# Patient Record
Sex: FEMALE | Race: BLACK OR AFRICAN AMERICAN | Employment: UNEMPLOYED | URBAN - METROPOLITAN AREA
[De-identification: names, ages, dates, MRNs, and addresses within clinical notes are randomized per-mention and may not be internally consistent; named-entity substitution may affect disease eponyms.]

---

## 2017-01-01 ENCOUNTER — APPOINTMENT (OUTPATIENT)
Dept: INFUSION THERAPY | Age: 62
End: 2017-01-01

## 2017-01-01 ENCOUNTER — HOSPITAL ENCOUNTER (OUTPATIENT)
Dept: INFUSION THERAPY | Age: 62
End: 2017-01-01
Payer: MEDICAID

## 2017-01-01 ENCOUNTER — HOSPITAL ENCOUNTER (OUTPATIENT)
Dept: INFUSION THERAPY | Age: 62
Discharge: HOME | End: 2017-11-14

## 2017-01-01 ENCOUNTER — PATIENT OUTREACH (OUTPATIENT)
Dept: CASE MANAGEMENT | Age: 62
End: 2017-01-01

## 2017-01-01 ENCOUNTER — HOSPITAL ENCOUNTER (OUTPATIENT)
Dept: INFUSION THERAPY | Age: 62
Discharge: HOME | End: 2017-09-26
Payer: MEDICAID

## 2017-01-01 ENCOUNTER — HOSPITAL ENCOUNTER (OUTPATIENT)
Dept: LAB | Age: 62
Discharge: HOME | End: 2017-08-29
Payer: MEDICAID

## 2017-01-01 ENCOUNTER — HOSPITAL ENCOUNTER (OUTPATIENT)
Dept: RADIATION ONCOLOGY | Age: 62
Discharge: HOME | End: 2017-08-08
Payer: MEDICAID

## 2017-01-01 ENCOUNTER — HOSPITAL ENCOUNTER (OUTPATIENT)
Dept: INFUSION THERAPY | Age: 62
Discharge: HOME | End: 2017-08-15
Payer: MEDICAID

## 2017-01-01 ENCOUNTER — HOSPITAL ENCOUNTER (OUTPATIENT)
Dept: RADIATION ONCOLOGY | Age: 62
Discharge: HOME | End: 2017-08-28
Payer: MEDICAID

## 2017-01-01 ENCOUNTER — HOSPITAL ENCOUNTER (OUTPATIENT)
Dept: INFUSION THERAPY | Age: 62
Discharge: HOME | End: 2017-10-26
Payer: COMMERCIAL

## 2017-01-01 ENCOUNTER — APPOINTMENT (OUTPATIENT)
Dept: CT IMAGING | Age: 62
End: 2017-01-01
Payer: MEDICAID

## 2017-01-01 ENCOUNTER — HOSPITAL ENCOUNTER (OUTPATIENT)
Dept: LAB | Age: 62
Discharge: HOME | End: 2017-08-15
Payer: MEDICAID

## 2017-01-01 ENCOUNTER — HOSPITAL ENCOUNTER (OUTPATIENT)
Dept: RADIATION ONCOLOGY | Age: 62
Discharge: HOME | End: 2017-08-22
Payer: MEDICAID

## 2017-01-01 ENCOUNTER — HOSPITAL ENCOUNTER (OUTPATIENT)
Dept: MRI IMAGING | Age: 62
Discharge: HOME | End: 2017-07-10
Attending: INTERNAL MEDICINE
Payer: MEDICAID

## 2017-01-01 ENCOUNTER — HOSPITAL ENCOUNTER (OUTPATIENT)
Dept: RADIATION ONCOLOGY | Age: 62
Discharge: HOME | End: 2017-08-17
Payer: MEDICAID

## 2017-01-01 ENCOUNTER — APPOINTMENT (OUTPATIENT)
Dept: RADIATION ONCOLOGY | Age: 62
End: 2017-01-01
Payer: MEDICAID

## 2017-01-01 ENCOUNTER — TELEPHONE (OUTPATIENT)
Dept: ONCOLOGY | Age: 62
End: 2017-01-01

## 2017-01-01 ENCOUNTER — HOSPITAL ENCOUNTER (OUTPATIENT)
Dept: LAB | Age: 62
Discharge: HOME | End: 2017-11-14
Payer: COMMERCIAL

## 2017-01-01 ENCOUNTER — HOSPITAL ENCOUNTER (OUTPATIENT)
Dept: INFUSION THERAPY | Age: 62
Discharge: HOME | End: 2017-12-06
Payer: COMMERCIAL

## 2017-01-01 ENCOUNTER — DOCUMENTATION ONLY (OUTPATIENT)
Dept: ONCOLOGY | Age: 62
End: 2017-01-01

## 2017-01-01 ENCOUNTER — HOSPITAL ENCOUNTER (OUTPATIENT)
Dept: INFUSION THERAPY | Age: 62
Discharge: HOME | End: 2017-12-05
Payer: COMMERCIAL

## 2017-01-01 ENCOUNTER — HOSPITAL ENCOUNTER (OUTPATIENT)
Dept: INTERVENTIONAL RADIOLOGY/VASCULAR | Age: 62
Discharge: HOME | End: 2017-08-02
Attending: INTERNAL MEDICINE
Payer: MEDICAID

## 2017-01-01 ENCOUNTER — HOSPITAL ENCOUNTER (OUTPATIENT)
Dept: INFUSION THERAPY | Age: 62
Discharge: HOME | End: 2017-09-01
Payer: MEDICAID

## 2017-01-01 ENCOUNTER — HOSPITAL ENCOUNTER (OUTPATIENT)
Dept: INFUSION THERAPY | Age: 62
Discharge: HOME | End: 2017-09-27
Payer: MEDICAID

## 2017-01-01 ENCOUNTER — HOSPITAL ENCOUNTER (OUTPATIENT)
Dept: LAB | Age: 62
Discharge: HOME | End: 2017-12-04
Payer: COMMERCIAL

## 2017-01-01 ENCOUNTER — HOSPITAL ENCOUNTER (OUTPATIENT)
Dept: LAB | Age: 62
Discharge: HOME | End: 2017-09-26
Payer: MEDICAID

## 2017-01-01 ENCOUNTER — HOSPITAL ENCOUNTER (OUTPATIENT)
Dept: PET IMAGING | Age: 62
Discharge: HOME | End: 2017-07-06
Attending: INTERNAL MEDICINE
Payer: MEDICAID

## 2017-01-01 ENCOUNTER — HOSPITAL ENCOUNTER (OUTPATIENT)
Dept: RADIATION ONCOLOGY | Age: 62
Discharge: HOME | End: 2017-08-11
Payer: MEDICAID

## 2017-01-01 ENCOUNTER — HOSPITAL ENCOUNTER (OUTPATIENT)
Dept: RADIATION ONCOLOGY | Age: 62
Discharge: HOME | End: 2017-08-16
Payer: MEDICAID

## 2017-01-01 ENCOUNTER — HOSPITAL ENCOUNTER (OUTPATIENT)
Dept: INFUSION THERAPY | Age: 62
Discharge: HOME | End: 2017-08-30
Payer: MEDICAID

## 2017-01-01 ENCOUNTER — HOSPITAL ENCOUNTER (OUTPATIENT)
Dept: INFUSION THERAPY | Age: 62
Discharge: HOME | End: 2017-10-27
Payer: COMMERCIAL

## 2017-01-01 ENCOUNTER — HOSPITAL ENCOUNTER (OUTPATIENT)
Dept: INFUSION THERAPY | Age: 62
Discharge: HOME | End: 2017-12-07
Payer: COMMERCIAL

## 2017-01-01 ENCOUNTER — DOCUMENTATION ONLY (OUTPATIENT)
Dept: HEMATOLOGY | Age: 62
End: 2017-01-01

## 2017-01-01 ENCOUNTER — HOSPITAL ENCOUNTER (OUTPATIENT)
Dept: INFUSION THERAPY | Age: 62
End: 2017-01-01
Payer: COMMERCIAL

## 2017-01-01 ENCOUNTER — HOSPITAL ENCOUNTER (OUTPATIENT)
Dept: RADIATION ONCOLOGY | Age: 62
Discharge: HOME | End: 2017-08-21
Payer: MEDICAID

## 2017-01-01 ENCOUNTER — APPOINTMENT (OUTPATIENT)
Dept: GENERAL RADIOLOGY | Age: 62
End: 2017-01-01
Payer: MEDICAID

## 2017-01-01 ENCOUNTER — HOSPITAL ENCOUNTER (OUTPATIENT)
Dept: INFUSION THERAPY | Age: 62
Discharge: HOME | End: 2017-10-24
Payer: COMMERCIAL

## 2017-01-01 ENCOUNTER — HOSPITAL ENCOUNTER (OUTPATIENT)
Dept: RADIATION ONCOLOGY | Age: 62
Discharge: HOME | End: 2017-08-03
Payer: MEDICAID

## 2017-01-01 ENCOUNTER — HOSPITAL ENCOUNTER (OUTPATIENT)
Dept: RADIATION ONCOLOGY | Age: 62
Discharge: HOME | End: 2017-09-27
Payer: MEDICAID

## 2017-01-01 ENCOUNTER — HOSPITAL ENCOUNTER (OUTPATIENT)
Dept: RADIATION ONCOLOGY | Age: 62
Discharge: HOME | End: 2017-08-15
Payer: MEDICAID

## 2017-01-01 ENCOUNTER — HOSPITAL ENCOUNTER (OUTPATIENT)
Dept: LAB | Age: 62
Discharge: HOME | End: 2017-07-25
Payer: MEDICAID

## 2017-01-01 ENCOUNTER — HOSPITAL ENCOUNTER (OUTPATIENT)
Dept: LAB | Age: 62
Discharge: HOME | End: 2017-10-24
Payer: COMMERCIAL

## 2017-01-01 ENCOUNTER — HOSPITAL ENCOUNTER (OUTPATIENT)
Dept: INFUSION THERAPY | Age: 62
Discharge: HOME | End: 2017-09-28
Payer: MEDICAID

## 2017-01-01 ENCOUNTER — HOSPITAL ENCOUNTER (OUTPATIENT)
Dept: CT IMAGING | Age: 62
Discharge: HOME | End: 2017-11-29
Attending: INTERNAL MEDICINE
Payer: COMMERCIAL

## 2017-01-01 ENCOUNTER — HOSPITAL ENCOUNTER (OUTPATIENT)
Dept: INFUSION THERAPY | Age: 62
End: 2017-01-01

## 2017-01-01 ENCOUNTER — HOSPITAL ENCOUNTER (OUTPATIENT)
Dept: INFUSION THERAPY | Age: 62
Discharge: HOME | End: 2017-08-31
Payer: MEDICAID

## 2017-01-01 ENCOUNTER — HOSPITAL ENCOUNTER (EMERGENCY)
Age: 62
Discharge: HOME | End: 2017-06-19
Attending: EMERGENCY MEDICINE
Payer: MEDICAID

## 2017-01-01 ENCOUNTER — HOSPITAL ENCOUNTER (OUTPATIENT)
Dept: RADIATION ONCOLOGY | Age: 62
Discharge: HOME | End: 2017-08-14
Payer: MEDICAID

## 2017-01-01 ENCOUNTER — HOSPITAL ENCOUNTER (OUTPATIENT)
Dept: RADIATION ONCOLOGY | Age: 62
Discharge: HOME | End: 2017-08-10
Payer: MEDICAID

## 2017-01-01 ENCOUNTER — HOSPITAL ENCOUNTER (OUTPATIENT)
Dept: INFUSION THERAPY | Age: 62
Discharge: HOME | End: 2017-09-05
Payer: MEDICAID

## 2017-01-01 ENCOUNTER — HOSPITAL ENCOUNTER (OUTPATIENT)
Age: 62
Setting detail: OUTPATIENT SURGERY
Discharge: HOME | End: 2017-07-19
Attending: INTERNAL MEDICINE | Admitting: INTERNAL MEDICINE
Payer: MEDICAID

## 2017-01-01 ENCOUNTER — HOSPITAL ENCOUNTER (OUTPATIENT)
Dept: RADIATION ONCOLOGY | Age: 62
Discharge: HOME | End: 2017-08-29
Payer: MEDICAID

## 2017-01-01 ENCOUNTER — HOSPITAL ENCOUNTER (OUTPATIENT)
Dept: RADIATION ONCOLOGY | Age: 62
Discharge: HOME | End: 2017-08-09
Payer: MEDICAID

## 2017-01-01 ENCOUNTER — HOSPITAL ENCOUNTER (OUTPATIENT)
Dept: RADIATION ONCOLOGY | Age: 62
Discharge: HOME | End: 2017-08-07
Payer: MEDICAID

## 2017-01-01 ENCOUNTER — HOSPITAL ENCOUNTER (OUTPATIENT)
Dept: RADIATION ONCOLOGY | Age: 62
Discharge: HOME | End: 2017-08-18
Payer: MEDICAID

## 2017-01-01 ENCOUNTER — HOSPITAL ENCOUNTER (OUTPATIENT)
Dept: MRI IMAGING | Age: 62
Discharge: HOME | End: 2017-11-29
Attending: INTERNAL MEDICINE
Payer: COMMERCIAL

## 2017-01-01 ENCOUNTER — HOSPITAL ENCOUNTER (OUTPATIENT)
Dept: INFUSION THERAPY | Age: 62
Discharge: HOME | End: 2017-08-29
Payer: MEDICAID

## 2017-01-01 ENCOUNTER — HOSPITAL ENCOUNTER (OUTPATIENT)
Dept: RADIATION ONCOLOGY | Age: 62
Discharge: HOME | End: 2017-08-04
Payer: MEDICAID

## 2017-01-01 ENCOUNTER — HOSPITAL ENCOUNTER (OUTPATIENT)
Dept: PET IMAGING | Age: 62
Discharge: HOME | End: 2017-07-13
Attending: INTERNAL MEDICINE
Payer: MEDICAID

## 2017-01-01 ENCOUNTER — HOSPITAL ENCOUNTER (OUTPATIENT)
Dept: RADIATION ONCOLOGY | Age: 62
Discharge: HOME | End: 2017-08-23
Payer: MEDICAID

## 2017-01-01 VITALS
HEART RATE: 67 BPM | DIASTOLIC BLOOD PRESSURE: 53 MMHG | WEIGHT: 155 LBS | SYSTOLIC BLOOD PRESSURE: 107 MMHG | BODY MASS INDEX: 26.61 KG/M2 | OXYGEN SATURATION: 97 % | TEMPERATURE: 97.9 F | RESPIRATION RATE: 18 BRPM

## 2017-01-01 VITALS
TEMPERATURE: 97.8 F | WEIGHT: 147.2 LBS | RESPIRATION RATE: 18 BRPM | DIASTOLIC BLOOD PRESSURE: 91 MMHG | SYSTOLIC BLOOD PRESSURE: 118 MMHG | OXYGEN SATURATION: 100 % | BODY MASS INDEX: 25.27 KG/M2 | HEART RATE: 81 BPM

## 2017-01-01 VITALS
SYSTOLIC BLOOD PRESSURE: 132 MMHG | BODY MASS INDEX: 27.29 KG/M2 | WEIGHT: 159 LBS | DIASTOLIC BLOOD PRESSURE: 83 MMHG | HEART RATE: 80 BPM | TEMPERATURE: 97.7 F | OXYGEN SATURATION: 98 %

## 2017-01-01 VITALS
SYSTOLIC BLOOD PRESSURE: 93 MMHG | WEIGHT: 133 LBS | RESPIRATION RATE: 16 BRPM | BODY MASS INDEX: 22.83 KG/M2 | OXYGEN SATURATION: 100 % | HEART RATE: 83 BPM | TEMPERATURE: 98.7 F | DIASTOLIC BLOOD PRESSURE: 64 MMHG

## 2017-01-01 VITALS
BODY MASS INDEX: 24.34 KG/M2 | SYSTOLIC BLOOD PRESSURE: 122 MMHG | HEART RATE: 81 BPM | RESPIRATION RATE: 16 BRPM | WEIGHT: 141.8 LBS | DIASTOLIC BLOOD PRESSURE: 58 MMHG | TEMPERATURE: 97.9 F | OXYGEN SATURATION: 100 %

## 2017-01-01 VITALS
DIASTOLIC BLOOD PRESSURE: 70 MMHG | HEART RATE: 104 BPM | WEIGHT: 145 LBS | TEMPERATURE: 98 F | RESPIRATION RATE: 18 BRPM | SYSTOLIC BLOOD PRESSURE: 114 MMHG | OXYGEN SATURATION: 96 % | BODY MASS INDEX: 24.89 KG/M2

## 2017-01-01 VITALS
DIASTOLIC BLOOD PRESSURE: 72 MMHG | WEIGHT: 157.2 LBS | OXYGEN SATURATION: 97 % | BODY MASS INDEX: 26.98 KG/M2 | SYSTOLIC BLOOD PRESSURE: 132 MMHG | HEART RATE: 69 BPM | TEMPERATURE: 97.9 F | RESPIRATION RATE: 18 BRPM

## 2017-01-01 VITALS
SYSTOLIC BLOOD PRESSURE: 137 MMHG | TEMPERATURE: 97.8 F | DIASTOLIC BLOOD PRESSURE: 82 MMHG | BODY MASS INDEX: 27.66 KG/M2 | WEIGHT: 162 LBS | HEIGHT: 64 IN | HEART RATE: 65 BPM | RESPIRATION RATE: 16 BRPM | OXYGEN SATURATION: 98 %

## 2017-01-01 VITALS
RESPIRATION RATE: 16 BRPM | SYSTOLIC BLOOD PRESSURE: 105 MMHG | OXYGEN SATURATION: 100 % | DIASTOLIC BLOOD PRESSURE: 70 MMHG | HEART RATE: 73 BPM | BODY MASS INDEX: 23.69 KG/M2 | TEMPERATURE: 97.4 F | WEIGHT: 138 LBS

## 2017-01-01 VITALS
BODY MASS INDEX: 24.24 KG/M2 | RESPIRATION RATE: 16 BRPM | OXYGEN SATURATION: 99 % | WEIGHT: 141.2 LBS | DIASTOLIC BLOOD PRESSURE: 68 MMHG | HEART RATE: 82 BPM | TEMPERATURE: 97.8 F | SYSTOLIC BLOOD PRESSURE: 112 MMHG

## 2017-01-01 VITALS
TEMPERATURE: 97.7 F | HEART RATE: 69 BPM | OXYGEN SATURATION: 99 % | BODY MASS INDEX: 26.19 KG/M2 | DIASTOLIC BLOOD PRESSURE: 61 MMHG | SYSTOLIC BLOOD PRESSURE: 95 MMHG | WEIGHT: 152.6 LBS | RESPIRATION RATE: 18 BRPM

## 2017-01-01 VITALS
BODY MASS INDEX: 23.34 KG/M2 | HEART RATE: 82 BPM | OXYGEN SATURATION: 98 % | WEIGHT: 136 LBS | TEMPERATURE: 97.5 F | SYSTOLIC BLOOD PRESSURE: 104 MMHG | RESPIRATION RATE: 16 BRPM | DIASTOLIC BLOOD PRESSURE: 55 MMHG

## 2017-01-01 VITALS
HEIGHT: 67 IN | WEIGHT: 210 LBS | RESPIRATION RATE: 18 BRPM | BODY MASS INDEX: 32.96 KG/M2 | OXYGEN SATURATION: 98 % | DIASTOLIC BLOOD PRESSURE: 94 MMHG | TEMPERATURE: 98.6 F | HEART RATE: 87 BPM | SYSTOLIC BLOOD PRESSURE: 167 MMHG

## 2017-01-01 VITALS
WEIGHT: 144.2 LBS | DIASTOLIC BLOOD PRESSURE: 75 MMHG | RESPIRATION RATE: 18 BRPM | HEART RATE: 69 BPM | SYSTOLIC BLOOD PRESSURE: 112 MMHG | OXYGEN SATURATION: 100 % | TEMPERATURE: 97.6 F | BODY MASS INDEX: 24.75 KG/M2

## 2017-01-01 VITALS
OXYGEN SATURATION: 95 % | SYSTOLIC BLOOD PRESSURE: 108 MMHG | WEIGHT: 160 LBS | DIASTOLIC BLOOD PRESSURE: 68 MMHG | BODY MASS INDEX: 25.11 KG/M2 | RESPIRATION RATE: 18 BRPM | HEART RATE: 98 BPM | HEIGHT: 67 IN | TEMPERATURE: 98.1 F

## 2017-01-01 DIAGNOSIS — R91.1 LUNG NODULE: ICD-10-CM

## 2017-01-01 DIAGNOSIS — C34.81 MALIGNANT NEOPLASM OF OVERLAPPING SITES OF RIGHT LUNG (HCC): ICD-10-CM

## 2017-01-01 DIAGNOSIS — Z72.0 TOBACCO ABUSE: ICD-10-CM

## 2017-01-01 DIAGNOSIS — C34.92 SMALL CELL CARCINOMA OF LUNG, LEFT (HCC): ICD-10-CM

## 2017-01-01 DIAGNOSIS — C79.31 BRAIN METASTASES (HCC): Primary | ICD-10-CM

## 2017-01-01 DIAGNOSIS — M54.30 SCIATICA, UNSPECIFIED LATERALITY: ICD-10-CM

## 2017-01-01 DIAGNOSIS — R11.2 NAUSEA AND VOMITING IN ADULT: ICD-10-CM

## 2017-01-01 DIAGNOSIS — R63.8 DEHYDRATION SYMPTOMS: ICD-10-CM

## 2017-01-01 DIAGNOSIS — R91.8 MULTIPLE LUNG NODULES ON CT: ICD-10-CM

## 2017-01-01 DIAGNOSIS — R59.0 MEDIASTINAL ADENOPATHY: ICD-10-CM

## 2017-01-01 DIAGNOSIS — R91.8 HILAR MASS: ICD-10-CM

## 2017-01-01 DIAGNOSIS — A59.9 TRICHOMONIASIS: ICD-10-CM

## 2017-01-01 DIAGNOSIS — I10 ESSENTIAL HYPERTENSION: Primary | ICD-10-CM

## 2017-01-01 DIAGNOSIS — F17.210 CIGARETTE SMOKER: ICD-10-CM

## 2017-01-01 DIAGNOSIS — R91.8 MASS OF RIGHT LUNG: ICD-10-CM

## 2017-01-01 LAB
ALBUMIN SERPL BCP-MCNC: 3.2 G/DL (ref 3.5–5)
ALBUMIN SERPL BCP-MCNC: 3.5 G/DL (ref 3.5–5)
ALBUMIN SERPL BCP-MCNC: 3.6 G/DL (ref 3.5–5)
ALBUMIN SERPL-MCNC: 3.2 G/DL (ref 3.2–4.6)
ALBUMIN SERPL-MCNC: 3.3 G/DL (ref 3.2–4.6)
ALBUMIN SERPL-MCNC: 3.3 G/DL (ref 3.2–4.6)
ALBUMIN SERPL-MCNC: 3.5 G/DL (ref 3.5–5)
ALBUMIN SERPL-MCNC: 3.6 G/DL (ref 3.2–4.6)
ALBUMIN SERPL-MCNC: 3.8 G/DL (ref 3.5–5)
ALBUMIN/GLOB SERPL: 0.8 {RATIO} (ref 1.2–3.5)
ALBUMIN/GLOB SERPL: 0.9 {RATIO} (ref 1.2–3.5)
ALBUMIN/GLOB SERPL: 1 {RATIO} (ref 1.2–3.5)
ALBUMIN/GLOB SERPL: 1 {RATIO} (ref 1.2–3.5)
ALBUMIN/GLOB SERPL: 1.1 {RATIO} (ref 1.2–3.5)
ALP SERPL-CCNC: 41 U/L (ref 50–136)
ALP SERPL-CCNC: 45 U/L (ref 50–136)
ALP SERPL-CCNC: 47 U/L (ref 50–136)
ALP SERPL-CCNC: 48 U/L (ref 50–136)
ALP SERPL-CCNC: 51 U/L (ref 50–136)
ALP SERPL-CCNC: 51 U/L (ref 50–136)
ALP SERPL-CCNC: 58 U/L (ref 50–136)
ALP SERPL-CCNC: 58 U/L (ref 50–136)
ALP SERPL-CCNC: 60 U/L (ref 50–136)
ALT SERPL-CCNC: 12 U/L (ref 12–65)
ALT SERPL-CCNC: 14 U/L (ref 12–65)
ALT SERPL-CCNC: 14 U/L (ref 12–65)
ALT SERPL-CCNC: 15 U/L (ref 12–65)
ALT SERPL-CCNC: 15 U/L (ref 12–65)
ALT SERPL-CCNC: 16 U/L (ref 12–65)
ALT SERPL-CCNC: 17 U/L (ref 12–65)
ALT SERPL-CCNC: 17 U/L (ref 12–65)
ALT SERPL-CCNC: 25 U/L (ref 12–65)
ANION GAP BLD CALC-SCNC: 10 MMOL/L (ref 7–16)
ANION GAP BLD CALC-SCNC: 5 MMOL/L (ref 7–16)
ANION GAP BLD CALC-SCNC: 6 MMOL/L (ref 7–16)
ANION GAP SERPL CALC-SCNC: 11 MMOL/L (ref 7–16)
ANION GAP SERPL CALC-SCNC: 5 MMOL/L (ref 7–16)
ANION GAP SERPL CALC-SCNC: 7 MMOL/L (ref 7–16)
ANION GAP SERPL CALC-SCNC: 9 MMOL/L (ref 7–16)
AST SERPL W P-5'-P-CCNC: 14 U/L (ref 15–37)
AST SERPL W P-5'-P-CCNC: 20 U/L (ref 15–37)
AST SERPL W P-5'-P-CCNC: 30 U/L (ref 15–37)
AST SERPL-CCNC: 14 U/L (ref 15–37)
AST SERPL-CCNC: 14 U/L (ref 15–37)
AST SERPL-CCNC: 17 U/L (ref 15–37)
AST SERPL-CCNC: 17 U/L (ref 15–37)
AST SERPL-CCNC: 18 U/L (ref 15–37)
AST SERPL-CCNC: 19 U/L (ref 15–37)
ATRIAL RATE: 84 BPM
BACTERIA URNS QL MICRO: 0 /HPF
BASOPHILS # BLD AUTO: 0 K/UL (ref 0–0.2)
BASOPHILS # BLD: 0 % (ref 0–2)
BASOPHILS # BLD: 0 K/UL (ref 0–0.2)
BASOPHILS # BLD: 1 % (ref 0–2)
BASOPHILS # BLD: 1 % (ref 0–2)
BASOPHILS NFR BLD: 0 % (ref 0–2)
BASOPHILS NFR BLD: 0 % (ref 0–2)
BASOPHILS NFR BLD: 1 % (ref 0–2)
BILIRUB SERPL-MCNC: 0.3 MG/DL (ref 0.2–1.1)
BILIRUB SERPL-MCNC: 0.4 MG/DL (ref 0.2–1.1)
BILIRUB SERPL-MCNC: 0.5 MG/DL (ref 0.2–1.1)
BILIRUB SERPL-MCNC: 0.5 MG/DL (ref 0.2–1.1)
BILIRUB SERPL-MCNC: 0.6 MG/DL (ref 0.2–1.1)
BILIRUB SERPL-MCNC: 0.6 MG/DL (ref 0.2–1.1)
BILIRUB SERPL-MCNC: 1 MG/DL (ref 0.2–1.1)
BUN SERPL-MCNC: 13 MG/DL (ref 6–23)
BUN SERPL-MCNC: 14 MG/DL (ref 6–23)
BUN SERPL-MCNC: 29 MG/DL (ref 6–23)
BUN SERPL-MCNC: 7 MG/DL (ref 8–23)
BUN SERPL-MCNC: 9 MG/DL (ref 8–23)
C TRACH RRNA SPEC QL NAA+PROBE: NEGATIVE
CALCIUM SERPL-MCNC: 8.2 MG/DL (ref 8.3–10.4)
CALCIUM SERPL-MCNC: 8.5 MG/DL (ref 8.3–10.4)
CALCIUM SERPL-MCNC: 8.7 MG/DL (ref 8.3–10.4)
CALCIUM SERPL-MCNC: 8.8 MG/DL (ref 8.3–10.4)
CALCIUM SERPL-MCNC: 9 MG/DL (ref 8.3–10.4)
CALCIUM SERPL-MCNC: 9.2 MG/DL (ref 8.3–10.4)
CALCIUM SERPL-MCNC: 9.2 MG/DL (ref 8.3–10.4)
CALCULATED P AXIS, ECG09: 66 DEGREES
CALCULATED R AXIS, ECG10: 77 DEGREES
CALCULATED T AXIS, ECG11: 54 DEGREES
CASTS URNS QL MICRO: 0 /LPF
CHLORIDE SERPL-SCNC: 104 MMOL/L (ref 98–107)
CHLORIDE SERPL-SCNC: 106 MMOL/L (ref 98–107)
CHLORIDE SERPL-SCNC: 107 MMOL/L (ref 98–107)
CHLORIDE SERPL-SCNC: 108 MMOL/L (ref 98–107)
CHLORIDE SERPL-SCNC: 110 MMOL/L (ref 98–107)
CHLORIDE SERPL-SCNC: 98 MMOL/L (ref 98–107)
CO2 SERPL-SCNC: 26 MMOL/L (ref 21–32)
CO2 SERPL-SCNC: 27 MMOL/L (ref 21–32)
CO2 SERPL-SCNC: 27 MMOL/L (ref 21–32)
CO2 SERPL-SCNC: 28 MMOL/L (ref 21–32)
CO2 SERPL-SCNC: 29 MMOL/L (ref 21–32)
CO2 SERPL-SCNC: 29 MMOL/L (ref 21–32)
CREAT SERPL-MCNC: 0.7 MG/DL (ref 0.6–1)
CREAT SERPL-MCNC: 0.74 MG/DL (ref 0.6–1)
CREAT SERPL-MCNC: 0.82 MG/DL (ref 0.6–1)
CREAT SERPL-MCNC: 0.84 MG/DL (ref 0.6–1)
CREAT SERPL-MCNC: 0.92 MG/DL (ref 0.6–1)
CREAT SERPL-MCNC: 0.95 MG/DL (ref 0.6–1)
CREAT SERPL-MCNC: 0.99 MG/DL (ref 0.6–1)
CREAT SERPL-MCNC: 1.2 MG/DL (ref 0.6–1)
CREAT SERPL-MCNC: 1.2 MG/DL (ref 0.6–1)
CRYSTALS URNS QL MICRO: 0 /LPF
DIAGNOSIS, 93000: NORMAL
DIFFERENTIAL METHOD BLD: ABNORMAL
DIFFERENTIAL METHOD BLD: NORMAL
EOSINOPHIL # BLD: 0 K/UL (ref 0–0.8)
EOSINOPHIL # BLD: 0.1 K/UL (ref 0–0.8)
EOSINOPHIL # BLD: 0.1 K/UL (ref 0–0.8)
EOSINOPHIL # BLD: 0.2 K/UL (ref 0–0.8)
EOSINOPHIL # BLD: 0.2 K/UL (ref 0–0.8)
EOSINOPHIL NFR BLD: 0 % (ref 0.5–7.8)
EOSINOPHIL NFR BLD: 1 % (ref 0.5–7.8)
EOSINOPHIL NFR BLD: 1 % (ref 0.5–7.8)
EOSINOPHIL NFR BLD: 2 % (ref 0.5–7.8)
EOSINOPHIL NFR BLD: 3 % (ref 0.5–7.8)
EOSINOPHIL NFR BLD: 3 % (ref 0.5–7.8)
EOSINOPHIL NFR BLD: 4 % (ref 0.5–7.8)
EPI CELLS #/AREA URNS HPF: NORMAL /HPF
ERYTHROCYTE [DISTWIDTH] IN BLOOD BY AUTOMATED COUNT: 13.7 % (ref 11.9–14.6)
ERYTHROCYTE [DISTWIDTH] IN BLOOD BY AUTOMATED COUNT: 13.8 % (ref 11.9–14.6)
ERYTHROCYTE [DISTWIDTH] IN BLOOD BY AUTOMATED COUNT: 13.9 % (ref 11.9–14.6)
ERYTHROCYTE [DISTWIDTH] IN BLOOD BY AUTOMATED COUNT: 14 % (ref 11.9–14.6)
ERYTHROCYTE [DISTWIDTH] IN BLOOD BY AUTOMATED COUNT: 14 % (ref 11.9–14.6)
ERYTHROCYTE [DISTWIDTH] IN BLOOD BY AUTOMATED COUNT: 14.4 % (ref 11.9–14.6)
ERYTHROCYTE [DISTWIDTH] IN BLOOD BY AUTOMATED COUNT: 16.3 % (ref 11.9–14.6)
ERYTHROCYTE [DISTWIDTH] IN BLOOD BY AUTOMATED COUNT: 16.7 % (ref 11.9–14.6)
ERYTHROCYTE [DISTWIDTH] IN BLOOD BY AUTOMATED COUNT: 19.7 % (ref 11.9–14.6)
GLOBULIN SER CALC-MCNC: 3.1 G/DL (ref 2.3–3.5)
GLOBULIN SER CALC-MCNC: 3.5 G/DL (ref 2.3–3.5)
GLOBULIN SER CALC-MCNC: 3.6 G/DL (ref 2.3–3.5)
GLOBULIN SER CALC-MCNC: 3.7 G/DL (ref 2.3–3.5)
GLOBULIN SER CALC-MCNC: 3.7 G/DL (ref 2.3–3.5)
GLOBULIN SER CALC-MCNC: 3.8 G/DL (ref 2.3–3.5)
GLOBULIN SER CALC-MCNC: 4 G/DL (ref 2.3–3.5)
GLOBULIN SER CALC-MCNC: 4.5 G/DL (ref 2.3–3.5)
GLOBULIN SER CALC-MCNC: 4.6 G/DL (ref 2.3–3.5)
GLUCOSE SERPL-MCNC: 109 MG/DL (ref 65–100)
GLUCOSE SERPL-MCNC: 61 MG/DL (ref 65–100)
GLUCOSE SERPL-MCNC: 90 MG/DL (ref 65–100)
GLUCOSE SERPL-MCNC: 92 MG/DL (ref 65–100)
GLUCOSE SERPL-MCNC: 94 MG/DL (ref 65–100)
GLUCOSE SERPL-MCNC: 95 MG/DL (ref 65–100)
GLUCOSE SERPL-MCNC: 98 MG/DL (ref 65–100)
HCT VFR BLD AUTO: 21.6 % (ref 35.8–46.3)
HCT VFR BLD AUTO: 25.9 % (ref 35.8–46.3)
HCT VFR BLD AUTO: 29 % (ref 35.8–46.3)
HCT VFR BLD AUTO: 30.9 % (ref 35.8–46.3)
HCT VFR BLD AUTO: 35.3 % (ref 35.8–46.3)
HCT VFR BLD AUTO: 35.8 % (ref 35.8–46.3)
HCT VFR BLD AUTO: 36.2 % (ref 35.8–46.3)
HCT VFR BLD AUTO: 37.7 % (ref 35.8–46.3)
HCT VFR BLD AUTO: 41.8 % (ref 35.8–46.3)
HGB BLD-MCNC: 10.3 G/DL (ref 11.7–15.4)
HGB BLD-MCNC: 11.6 G/DL (ref 11.7–15.4)
HGB BLD-MCNC: 11.9 G/DL (ref 11.7–15.4)
HGB BLD-MCNC: 12.1 G/DL (ref 11.7–15.4)
HGB BLD-MCNC: 12.7 G/DL (ref 11.7–15.4)
HGB BLD-MCNC: 13.9 G/DL (ref 11.7–15.4)
HGB BLD-MCNC: 7.2 G/DL (ref 11.7–15.4)
HGB BLD-MCNC: 8.4 G/DL (ref 11.7–15.4)
HGB BLD-MCNC: 9.5 G/DL (ref 11.7–15.4)
IMM GRANULOCYTES # BLD: 0 K/UL (ref 0–0.5)
IMM GRANULOCYTES NFR BLD AUTO: 0.3 % (ref 0–5)
INR BLD: NORMAL
LYMPHOCYTES # BLD AUTO: 38 % (ref 13–44)
LYMPHOCYTES # BLD AUTO: 41 % (ref 13–44)
LYMPHOCYTES # BLD AUTO: 48 % (ref 13–44)
LYMPHOCYTES # BLD: 0.9 K/UL (ref 0.5–4.6)
LYMPHOCYTES # BLD: 0.9 K/UL (ref 0.5–4.6)
LYMPHOCYTES # BLD: 1 K/UL (ref 0.5–4.6)
LYMPHOCYTES # BLD: 1 K/UL (ref 0.5–4.6)
LYMPHOCYTES # BLD: 1.2 K/UL (ref 0.5–4.6)
LYMPHOCYTES # BLD: 1.4 K/UL (ref 0.5–4.6)
LYMPHOCYTES # BLD: 1.6 K/UL (ref 0.5–4.6)
LYMPHOCYTES # BLD: 2.7 K/UL (ref 0.5–4.6)
LYMPHOCYTES # BLD: 2.8 K/UL (ref 0.5–4.6)
LYMPHOCYTES NFR BLD: 20 % (ref 13–44)
LYMPHOCYTES NFR BLD: 23 % (ref 13–44)
LYMPHOCYTES NFR BLD: 25 % (ref 13–44)
LYMPHOCYTES NFR BLD: 33 % (ref 13–44)
LYMPHOCYTES NFR BLD: 36 % (ref 13–44)
LYMPHOCYTES NFR BLD: 57 % (ref 13–44)
MAGNESIUM SERPL-MCNC: 1.3 MG/DL (ref 1.8–2.4)
MAGNESIUM SERPL-MCNC: 1.7 MG/DL (ref 1.8–2.4)
MAGNESIUM SERPL-MCNC: 1.9 MG/DL (ref 1.8–2.4)
MAGNESIUM SERPL-MCNC: 2.1 MG/DL (ref 1.8–2.4)
MAGNESIUM SERPL-MCNC: 2.6 MG/DL (ref 1.8–2.4)
MCH RBC QN AUTO: 28.6 PG (ref 26.1–32.9)
MCH RBC QN AUTO: 28.7 PG (ref 26.1–32.9)
MCH RBC QN AUTO: 28.9 PG (ref 26.1–32.9)
MCH RBC QN AUTO: 28.9 PG (ref 26.1–32.9)
MCH RBC QN AUTO: 29 PG (ref 26.1–32.9)
MCH RBC QN AUTO: 29.1 PG (ref 26.1–32.9)
MCH RBC QN AUTO: 29.1 PG (ref 26.1–32.9)
MCH RBC QN AUTO: 29.5 PG (ref 26.1–32.9)
MCH RBC QN AUTO: 30.4 PG (ref 26.1–32.9)
MCHC RBC AUTO-ENTMCNC: 32.4 G/DL (ref 31.4–35)
MCHC RBC AUTO-ENTMCNC: 32.8 G/DL (ref 31.4–35)
MCHC RBC AUTO-ENTMCNC: 32.9 G/DL (ref 31.4–35)
MCHC RBC AUTO-ENTMCNC: 33.2 G/DL (ref 31.4–35)
MCHC RBC AUTO-ENTMCNC: 33.3 G/DL (ref 31.4–35)
MCHC RBC AUTO-ENTMCNC: 33.4 G/DL (ref 31.4–35)
MCHC RBC AUTO-ENTMCNC: 33.7 G/DL (ref 31.4–35)
MCV RBC AUTO: 84.9 FL (ref 79.6–97.8)
MCV RBC AUTO: 86.4 FL (ref 79.6–97.8)
MCV RBC AUTO: 86.9 FL (ref 79.6–97.8)
MCV RBC AUTO: 87.3 FL (ref 79.6–97.8)
MCV RBC AUTO: 87.4 FL (ref 79.6–97.8)
MCV RBC AUTO: 87.4 FL (ref 79.6–97.8)
MCV RBC AUTO: 88.4 FL (ref 79.6–97.8)
MCV RBC AUTO: 88.5 FL (ref 79.6–97.8)
MCV RBC AUTO: 93.8 FL (ref 79.6–97.8)
MONOCYTES # BLD: 0 K/UL (ref 0.1–1.3)
MONOCYTES # BLD: 0.4 K/UL (ref 0.1–1.3)
MONOCYTES # BLD: 0.6 K/UL (ref 0.1–1.3)
MONOCYTES # BLD: 0.6 K/UL (ref 0.1–1.3)
MONOCYTES # BLD: 0.7 K/UL (ref 0.1–1.3)
MONOCYTES # BLD: 0.7 K/UL (ref 0.1–1.3)
MONOCYTES NFR BLD AUTO: 10 % (ref 4–12)
MONOCYTES NFR BLD AUTO: 10 % (ref 4–12)
MONOCYTES NFR BLD AUTO: 8 % (ref 4–12)
MONOCYTES NFR BLD: 1 % (ref 4–12)
MONOCYTES NFR BLD: 12 % (ref 4–12)
MONOCYTES NFR BLD: 13 % (ref 4–12)
MONOCYTES NFR BLD: 24 % (ref 4–12)
MUCOUS THREADS URNS QL MICRO: 0 /LPF
N GONORRHOEA RRNA SPEC QL NAA+PROBE: NEGATIVE
NEUTS SEG # BLD: 0.3 K/UL (ref 1.7–8.2)
NEUTS SEG # BLD: 1.4 K/UL (ref 1.7–8.2)
NEUTS SEG # BLD: 1.6 K/UL (ref 1.7–8.2)
NEUTS SEG # BLD: 2 K/UL (ref 1.7–8.2)
NEUTS SEG # BLD: 2.1 K/UL (ref 1.7–8.2)
NEUTS SEG # BLD: 2.7 K/UL (ref 1.7–8.2)
NEUTS SEG # BLD: 3.3 K/UL (ref 1.7–8.2)
NEUTS SEG # BLD: 3.8 K/UL (ref 1.7–8.2)
NEUTS SEG # BLD: 3.8 K/UL (ref 1.7–8.2)
NEUTS SEG NFR BLD AUTO: 38 % (ref 43–78)
NEUTS SEG NFR BLD AUTO: 48 % (ref 43–78)
NEUTS SEG NFR BLD AUTO: 48 % (ref 43–78)
NEUTS SEG NFR BLD: 19 % (ref 43–78)
NEUTS SEG NFR BLD: 49 % (ref 43–78)
NEUTS SEG NFR BLD: 54 % (ref 43–78)
NEUTS SEG NFR BLD: 65 % (ref 43–78)
NEUTS SEG NFR BLD: 67 % (ref 43–78)
NEUTS SEG NFR BLD: 72 % (ref 43–78)
NRBC # BLD: 0 K/UL (ref 0–0.2)
NRBC # BLD: 0.01 K/UL (ref 0–0.2)
NRBC BLD-RTO: 0 PER 100 WBC (ref 0–2)
NRBC BLD-RTO: 0 PER 100 WBC (ref 0–2)
P-R INTERVAL, ECG05: 176 MS
PLATELET # BLD AUTO: 136 K/UL (ref 150–450)
PLATELET # BLD AUTO: 151 K/UL (ref 150–450)
PLATELET # BLD AUTO: 177 K/UL (ref 150–450)
PLATELET # BLD AUTO: 180 K/UL (ref 150–450)
PLATELET # BLD AUTO: 190 K/UL (ref 150–450)
PLATELET # BLD AUTO: 225 K/UL (ref 150–450)
PLATELET # BLD AUTO: 246 K/UL (ref 150–450)
PLATELET # BLD AUTO: 252 K/UL (ref 150–450)
PLATELET # BLD AUTO: 324 K/UL (ref 150–450)
PMV BLD AUTO: 10 FL (ref 10.8–14.1)
PMV BLD AUTO: 10.6 FL (ref 10.8–14.1)
PMV BLD AUTO: 10.7 FL (ref 10.8–14.1)
PMV BLD AUTO: 10.9 FL (ref 10.8–14.1)
PMV BLD AUTO: 11 FL (ref 10.8–14.1)
PMV BLD AUTO: 11.1 FL (ref 10.8–14.1)
PMV BLD AUTO: 11.4 FL (ref 10.8–14.1)
PMV BLD AUTO: 9.6 FL (ref 10.8–14.1)
PMV BLD AUTO: 9.7 FL (ref 10.8–14.1)
POTASSIUM SERPL-SCNC: 3.1 MMOL/L (ref 3.5–5.1)
POTASSIUM SERPL-SCNC: 3.2 MMOL/L (ref 3.5–5.1)
POTASSIUM SERPL-SCNC: 3.2 MMOL/L (ref 3.5–5.1)
POTASSIUM SERPL-SCNC: 3.3 MMOL/L (ref 3.5–5.1)
POTASSIUM SERPL-SCNC: 3.4 MMOL/L (ref 3.5–5.1)
POTASSIUM SERPL-SCNC: 3.8 MMOL/L (ref 3.5–5.1)
POTASSIUM SERPL-SCNC: 3.9 MMOL/L (ref 3.5–5.1)
POTASSIUM SERPL-SCNC: 3.9 MMOL/L (ref 3.5–5.1)
POTASSIUM SERPL-SCNC: 4.6 MMOL/L (ref 3.5–5.1)
PROT SERPL-MCNC: 6.4 G/DL (ref 6.3–8.2)
PROT SERPL-MCNC: 6.8 G/DL (ref 6.3–8.2)
PROT SERPL-MCNC: 6.9 G/DL (ref 6.3–8.2)
PROT SERPL-MCNC: 7.2 G/DL (ref 6.3–8.2)
PROT SERPL-MCNC: 7.2 G/DL (ref 6.3–8.2)
PROT SERPL-MCNC: 7.3 G/DL (ref 6.3–8.2)
PROT SERPL-MCNC: 7.3 G/DL (ref 6.3–8.2)
PROT SERPL-MCNC: 8.1 G/DL (ref 6.3–8.2)
PROT SERPL-MCNC: 8.3 G/DL (ref 6.3–8.2)
Q-T INTERVAL, ECG07: 370 MS
QRS DURATION, ECG06: 78 MS
QTC CALCULATION (BEZET), ECG08: 437 MS
RBC # BLD AUTO: 2.44 M/UL (ref 4.05–5.25)
RBC # BLD AUTO: 2.76 M/UL (ref 4.05–5.25)
RBC # BLD AUTO: 3.28 M/UL (ref 4.05–5.25)
RBC # BLD AUTO: 3.54 M/UL (ref 4.05–5.25)
RBC # BLD AUTO: 4.04 M/UL (ref 4.05–5.25)
RBC # BLD AUTO: 4.12 M/UL (ref 4.05–5.25)
RBC # BLD AUTO: 4.19 M/UL (ref 4.05–5.25)
RBC # BLD AUTO: 4.44 M/UL (ref 4.05–5.25)
RBC # BLD AUTO: 4.78 M/UL (ref 4.05–5.25)
RBC #/AREA URNS HPF: 0 /HPF
SODIUM SERPL-SCNC: 137 MMOL/L (ref 136–145)
SODIUM SERPL-SCNC: 139 MMOL/L (ref 136–145)
SODIUM SERPL-SCNC: 139 MMOL/L (ref 136–145)
SODIUM SERPL-SCNC: 141 MMOL/L (ref 136–145)
SODIUM SERPL-SCNC: 142 MMOL/L (ref 136–145)
SODIUM SERPL-SCNC: 144 MMOL/L (ref 136–145)
SPECIMEN SOURCE: NORMAL
TRICHOMONAS UR QL MICRO: NORMAL
TROPONIN I BLD-MCNC: 0.01 NG/ML (ref 0–0.08)
VENTRICULAR RATE, ECG03: 84 BPM
WBC # BLD AUTO: 1.6 K/UL (ref 4.3–11.1)
WBC # BLD AUTO: 2.9 K/UL (ref 4.3–11.1)
WBC # BLD AUTO: 3 K/UL (ref 4.3–11.1)
WBC # BLD AUTO: 3.8 K/UL (ref 4.3–11.1)
WBC # BLD AUTO: 4.1 K/UL (ref 4.3–11.1)
WBC # BLD AUTO: 5.7 K/UL (ref 4.3–11.1)
WBC # BLD AUTO: 5.7 K/UL (ref 4.3–11.1)
WBC # BLD AUTO: 5.9 K/UL (ref 4.3–11.1)
WBC # BLD AUTO: 6.9 K/UL (ref 4.3–11.1)
WBC URNS QL MICRO: NORMAL /HPF

## 2017-01-01 PROCEDURE — 74177 CT ABD & PELVIS W/CONTRAST: CPT

## 2017-01-01 PROCEDURE — 77030003560 HC NDL HUBR BARD -A

## 2017-01-01 PROCEDURE — 74011250636 HC RX REV CODE- 250/636: Performed by: PHYSICIAN ASSISTANT

## 2017-01-01 PROCEDURE — 81015 MICROSCOPIC EXAM OF URINE: CPT | Performed by: EMERGENCY MEDICINE

## 2017-01-01 PROCEDURE — 85025 COMPLETE CBC W/AUTO DIFF WBC: CPT | Performed by: PHYSICIAN ASSISTANT

## 2017-01-01 PROCEDURE — 36415 COLL VENOUS BLD VENIPUNCTURE: CPT | Performed by: INTERNAL MEDICINE

## 2017-01-01 PROCEDURE — 74011000250 HC RX REV CODE- 250: Performed by: PHYSICIAN ASSISTANT

## 2017-01-01 PROCEDURE — 77412 RADIATION TX DELIVERY LVL 3: CPT

## 2017-01-01 PROCEDURE — 96413 CHEMO IV INFUSION 1 HR: CPT

## 2017-01-01 PROCEDURE — 72100 X-RAY EXAM L-S SPINE 2/3 VWS: CPT

## 2017-01-01 PROCEDURE — 96417 CHEMO IV INFUS EACH ADDL SEQ: CPT

## 2017-01-01 PROCEDURE — 70553 MRI BRAIN STEM W/O & W/DYE: CPT

## 2017-01-01 PROCEDURE — 88172 CYTP DX EVAL FNA 1ST EA SITE: CPT | Performed by: INTERNAL MEDICINE

## 2017-01-01 PROCEDURE — 77334 RADIATION TREATMENT AID(S): CPT

## 2017-01-01 PROCEDURE — 74011636320 HC RX REV CODE- 636/320: Performed by: INTERNAL MEDICINE

## 2017-01-01 PROCEDURE — 71260 CT THORAX DX C+: CPT

## 2017-01-01 PROCEDURE — 96367 TX/PROPH/DG ADDL SEQ IV INF: CPT

## 2017-01-01 PROCEDURE — 77331 SPECIAL RADIATION DOSIMETRY: CPT

## 2017-01-01 PROCEDURE — 31652 BRONCH EBUS SAMPLNG 1/2 NODE: CPT | Performed by: INTERNAL MEDICINE

## 2017-01-01 PROCEDURE — 77030012699 HC VLV SUC CNTRL OCOA -A: Performed by: INTERNAL MEDICINE

## 2017-01-01 PROCEDURE — 80053 COMPREHEN METABOLIC PANEL: CPT | Performed by: PHYSICIAN ASSISTANT

## 2017-01-01 PROCEDURE — 96375 TX/PRO/DX INJ NEW DRUG ADDON: CPT

## 2017-01-01 PROCEDURE — 71020 XR CHEST PA LAT: CPT

## 2017-01-01 PROCEDURE — A9552 F18 FDG: HCPCS

## 2017-01-01 PROCEDURE — 77295 3-D RADIOTHERAPY PLAN: CPT

## 2017-01-01 PROCEDURE — 74011250636 HC RX REV CODE- 250/636

## 2017-01-01 PROCEDURE — 99153 MOD SED SAME PHYS/QHP EA: CPT | Performed by: INTERNAL MEDICINE

## 2017-01-01 PROCEDURE — 74011250636 HC RX REV CODE- 250/636: Performed by: NURSE PRACTITIONER

## 2017-01-01 PROCEDURE — 77030003406 HC NDL ASPIR BIOP OCOA -C: Performed by: INTERNAL MEDICINE

## 2017-01-01 PROCEDURE — 83735 ASSAY OF MAGNESIUM: CPT | Performed by: INTERNAL MEDICINE

## 2017-01-01 PROCEDURE — 93005 ELECTROCARDIOGRAM TRACING: CPT | Performed by: PHYSICIAN ASSISTANT

## 2017-01-01 PROCEDURE — 99211 OFF/OP EST MAY X REQ PHY/QHP: CPT

## 2017-01-01 PROCEDURE — 94640 AIRWAY INHALATION TREATMENT: CPT

## 2017-01-01 PROCEDURE — 85025 COMPLETE CBC W/AUTO DIFF WBC: CPT | Performed by: INTERNAL MEDICINE

## 2017-01-01 PROCEDURE — 77290 THER RAD SIMULAJ FIELD CPLX: CPT

## 2017-01-01 PROCEDURE — 74011250636 HC RX REV CODE- 250/636: Performed by: RADIOLOGY

## 2017-01-01 PROCEDURE — 74011250636 HC RX REV CODE- 250/636: Performed by: INTERNAL MEDICINE

## 2017-01-01 PROCEDURE — 74011000258 HC RX REV CODE- 258: Performed by: NURSE PRACTITIONER

## 2017-01-01 PROCEDURE — 77417 THER RADIOLOGY PORT IMAGE(S): CPT

## 2017-01-01 PROCEDURE — 96361 HYDRATE IV INFUSION ADD-ON: CPT

## 2017-01-01 PROCEDURE — 96372 THER/PROPH/DIAG INJ SC/IM: CPT | Performed by: PHYSICIAN ASSISTANT

## 2017-01-01 PROCEDURE — 99152 MOD SED SAME PHYS/QHP 5/>YRS: CPT | Performed by: INTERNAL MEDICINE

## 2017-01-01 PROCEDURE — 80053 COMPREHEN METABOLIC PANEL: CPT | Performed by: INTERNAL MEDICINE

## 2017-01-01 PROCEDURE — 99153 MOD SED SAME PHYS/QHP EA: CPT

## 2017-01-01 PROCEDURE — 74011250637 HC RX REV CODE- 250/637: Performed by: PHYSICIAN ASSISTANT

## 2017-01-01 PROCEDURE — 77336 RADIATION PHYSICS CONSULT: CPT

## 2017-01-01 PROCEDURE — C1788 PORT, INDWELLING, IMP: HCPCS

## 2017-01-01 PROCEDURE — 83735 ASSAY OF MAGNESIUM: CPT

## 2017-01-01 PROCEDURE — 74011000258 HC RX REV CODE- 258: Performed by: INTERNAL MEDICINE

## 2017-01-01 PROCEDURE — 80053 COMPREHEN METABOLIC PANEL: CPT

## 2017-01-01 PROCEDURE — 84484 ASSAY OF TROPONIN QUANT: CPT

## 2017-01-01 PROCEDURE — 77030002916 HC SUT ETHLN J&J -A

## 2017-01-01 PROCEDURE — 88173 CYTOPATH EVAL FNA REPORT: CPT | Performed by: INTERNAL MEDICINE

## 2017-01-01 PROCEDURE — 77300 RADIATION THERAPY DOSE PLAN: CPT

## 2017-01-01 PROCEDURE — 85025 COMPLETE CBC W/AUTO DIFF WBC: CPT

## 2017-01-01 PROCEDURE — C1894 INTRO/SHEATH, NON-LASER: HCPCS

## 2017-01-01 PROCEDURE — 88341 IMHCHEM/IMCYTCHM EA ADD ANTB: CPT | Performed by: INTERNAL MEDICINE

## 2017-01-01 PROCEDURE — 74011000250 HC RX REV CODE- 250: Performed by: RADIOLOGY

## 2017-01-01 PROCEDURE — 77030031139 HC SUT VCRL2 J&J -A

## 2017-01-01 PROCEDURE — 99283 EMERGENCY DEPT VISIT LOW MDM: CPT | Performed by: PHYSICIAN ASSISTANT

## 2017-01-01 PROCEDURE — A9577 INJ MULTIHANCE: HCPCS | Performed by: INTERNAL MEDICINE

## 2017-01-01 PROCEDURE — 88342 IMHCHEM/IMCYTCHM 1ST ANTB: CPT | Performed by: INTERNAL MEDICINE

## 2017-01-01 PROCEDURE — 74011000250 HC RX REV CODE- 250: Performed by: INTERNAL MEDICINE

## 2017-01-01 PROCEDURE — 99152 MOD SED SAME PHYS/QHP 5/>YRS: CPT

## 2017-01-01 PROCEDURE — 74011000258 HC RX REV CODE- 258: Performed by: EMERGENCY MEDICINE

## 2017-01-01 PROCEDURE — 73502 X-RAY EXAM HIP UNI 2-3 VIEWS: CPT

## 2017-01-01 PROCEDURE — 88305 TISSUE EXAM BY PATHOLOGIST: CPT | Performed by: INTERNAL MEDICINE

## 2017-01-01 PROCEDURE — 76040000026: Performed by: INTERNAL MEDICINE

## 2017-01-01 PROCEDURE — 85610 PROTHROMBIN TIME: CPT | Performed by: INTERNAL MEDICINE

## 2017-01-01 PROCEDURE — 74011636320 HC RX REV CODE- 636/320: Performed by: EMERGENCY MEDICINE

## 2017-01-01 PROCEDURE — 88177 CYTP FNA EVAL EA ADDL: CPT | Performed by: INTERNAL MEDICINE

## 2017-01-01 PROCEDURE — 77030009046 HC CATH BRNCH BLLN OCOA -B: Performed by: INTERNAL MEDICINE

## 2017-01-01 PROCEDURE — 96365 THER/PROPH/DIAG IV INF INIT: CPT

## 2017-01-01 PROCEDURE — 99204 OFFICE O/P NEW MOD 45 MIN: CPT | Performed by: INTERNAL MEDICINE

## 2017-01-01 PROCEDURE — 36561 INSERT TUNNELED CV CATH: CPT

## 2017-01-01 PROCEDURE — 87491 CHLMYD TRACH DNA AMP PROBE: CPT | Performed by: PHYSICIAN ASSISTANT

## 2017-01-01 PROCEDURE — 81003 URINALYSIS AUTO W/O SCOPE: CPT | Performed by: PHYSICIAN ASSISTANT

## 2017-01-01 PROCEDURE — 96366 THER/PROPH/DIAG IV INF ADDON: CPT

## 2017-01-01 RX ORDER — AMLODIPINE BESYLATE 10 MG/1
10 TABLET ORAL DAILY
Qty: 30 TAB | Refills: 0 | Status: SHIPPED | OUTPATIENT
Start: 2017-01-01 | End: 2017-01-01 | Stop reason: SDUPTHER

## 2017-01-01 RX ORDER — METOCLOPRAMIDE HYDROCHLORIDE 5 MG/ML
10 INJECTION INTRAMUSCULAR; INTRAVENOUS ONCE
Status: COMPLETED | OUTPATIENT
Start: 2017-01-01 | End: 2017-01-01

## 2017-01-01 RX ORDER — ALBUTEROL SULFATE 0.83 MG/ML
2.5 SOLUTION RESPIRATORY (INHALATION) AS NEEDED
Status: CANCELLED
Start: 2017-01-01

## 2017-01-01 RX ORDER — FENTANYL CITRATE 50 UG/ML
50 INJECTION, SOLUTION INTRAMUSCULAR; INTRAVENOUS
Status: DISCONTINUED | OUTPATIENT
Start: 2017-01-01 | End: 2017-01-01 | Stop reason: HOSPADM

## 2017-01-01 RX ORDER — PREDNISONE 10 MG/1
10 TABLET ORAL
Qty: 45 TAB | Refills: 0 | Status: SHIPPED | OUTPATIENT
Start: 2017-01-01 | End: 2017-01-01

## 2017-01-01 RX ORDER — ONDANSETRON 2 MG/ML
4 INJECTION INTRAMUSCULAR; INTRAVENOUS AS NEEDED
Status: DISCONTINUED | OUTPATIENT
Start: 2017-01-01 | End: 2017-01-01 | Stop reason: HOSPADM

## 2017-01-01 RX ORDER — HEPARIN 100 UNIT/ML
300-500 SYRINGE INTRAVENOUS AS NEEDED
Status: DISPENSED | OUTPATIENT
Start: 2017-01-01 | End: 2017-01-01

## 2017-01-01 RX ORDER — ONDANSETRON 2 MG/ML
8 INJECTION INTRAMUSCULAR; INTRAVENOUS ONCE
Status: COMPLETED | OUTPATIENT
Start: 2017-01-01 | End: 2017-01-01

## 2017-01-01 RX ORDER — LORAZEPAM 2 MG/ML
0.5 INJECTION INTRAMUSCULAR
Status: CANCELLED
Start: 2017-01-01

## 2017-01-01 RX ORDER — ACETAMINOPHEN 325 MG/1
650 TABLET ORAL AS NEEDED
Status: CANCELLED
Start: 2017-01-01

## 2017-01-01 RX ORDER — CEFAZOLIN SODIUM IN 0.9 % NACL 2 G/50 ML
2 INTRAVENOUS SOLUTION, PIGGYBACK (ML) INTRAVENOUS ONCE
Status: COMPLETED | OUTPATIENT
Start: 2017-01-01 | End: 2017-01-01

## 2017-01-01 RX ORDER — DIPHENHYDRAMINE HYDROCHLORIDE 50 MG/ML
25 INJECTION, SOLUTION INTRAMUSCULAR; INTRAVENOUS
Status: CANCELLED
Start: 2017-01-01

## 2017-01-01 RX ORDER — SODIUM CHLORIDE 0.9 % (FLUSH) 0.9 %
10 SYRINGE (ML) INJECTION
Status: COMPLETED | OUTPATIENT
Start: 2017-01-01 | End: 2017-01-01

## 2017-01-01 RX ORDER — DEXAMETHASONE SODIUM PHOSPHATE 100 MG/10ML
10 INJECTION INTRAMUSCULAR; INTRAVENOUS
Status: COMPLETED | OUTPATIENT
Start: 2017-01-01 | End: 2017-01-01

## 2017-01-01 RX ORDER — SODIUM CHLORIDE 0.9 % (FLUSH) 0.9 %
10 SYRINGE (ML) INJECTION AS NEEDED
Status: ACTIVE | OUTPATIENT
Start: 2017-01-01 | End: 2017-01-01

## 2017-01-01 RX ORDER — HEPARIN SODIUM 1000 [USP'U]/ML
2000 INJECTION, SOLUTION INTRAVENOUS; SUBCUTANEOUS AS NEEDED
Status: CANCELLED
Start: 2017-01-01

## 2017-01-01 RX ORDER — PROCHLORPERAZINE EDISYLATE 5 MG/ML
10 INJECTION INTRAMUSCULAR; INTRAVENOUS
Status: ACTIVE | OUTPATIENT
Start: 2017-01-01 | End: 2017-01-01

## 2017-01-01 RX ORDER — BENZONATATE 200 MG/1
200 CAPSULE ORAL
Qty: 30 CAP | Refills: 0 | Status: SHIPPED | OUTPATIENT
Start: 2017-01-01 | End: 2017-01-01

## 2017-01-01 RX ORDER — HEPARIN 100 UNIT/ML
300-500 SYRINGE INTRAVENOUS AS NEEDED
Status: CANCELLED
Start: 2017-01-01

## 2017-01-01 RX ORDER — SODIUM CHLORIDE 0.9 % (FLUSH) 0.9 %
10 SYRINGE (ML) INJECTION AS NEEDED
Status: CANCELLED
Start: 2017-01-01

## 2017-01-01 RX ORDER — DEXAMETHASONE SODIUM PHOSPHATE 100 MG/10ML
10 INJECTION INTRAMUSCULAR; INTRAVENOUS ONCE
Status: COMPLETED | OUTPATIENT
Start: 2017-01-01 | End: 2017-01-01

## 2017-01-01 RX ORDER — POTASSIUM CHLORIDE 14.9 MG/ML
20 INJECTION INTRAVENOUS ONCE
Status: COMPLETED | OUTPATIENT
Start: 2017-01-01 | End: 2017-01-01

## 2017-01-01 RX ORDER — HEPARIN 100 UNIT/ML
500 SYRINGE INTRAVENOUS ONCE
Status: COMPLETED | OUTPATIENT
Start: 2017-01-01 | End: 2017-01-01

## 2017-01-01 RX ORDER — SODIUM CHLORIDE 0.9 % (FLUSH) 0.9 %
5-10 SYRINGE (ML) INJECTION EVERY 8 HOURS
Status: DISCONTINUED | OUTPATIENT
Start: 2017-01-01 | End: 2017-01-01 | Stop reason: HOSPADM

## 2017-01-01 RX ORDER — IPRATROPIUM BROMIDE AND ALBUTEROL SULFATE 2.5; .5 MG/3ML; MG/3ML
3 SOLUTION RESPIRATORY (INHALATION)
Status: COMPLETED | OUTPATIENT
Start: 2017-01-01 | End: 2017-01-01

## 2017-01-01 RX ORDER — PROCHLORPERAZINE EDISYLATE 5 MG/ML
10 INJECTION INTRAMUSCULAR; INTRAVENOUS
Status: CANCELLED
Start: 2017-01-01

## 2017-01-01 RX ORDER — ALBUTEROL SULFATE 90 UG/1
2 AEROSOL, METERED RESPIRATORY (INHALATION)
Qty: 1 INHALER | Refills: 0 | Status: SHIPPED | OUTPATIENT
Start: 2017-01-01 | End: 2017-01-01 | Stop reason: SDUPTHER

## 2017-01-01 RX ORDER — ONDANSETRON 2 MG/ML
8 INJECTION INTRAMUSCULAR; INTRAVENOUS AS NEEDED
Status: CANCELLED | OUTPATIENT
Start: 2017-01-01

## 2017-01-01 RX ORDER — DEXAMETHASONE SODIUM PHOSPHATE 100 MG/10ML
10 INJECTION INTRAMUSCULAR; INTRAVENOUS ONCE
Status: CANCELLED
Start: 2017-01-01 | End: 2017-01-01

## 2017-01-01 RX ORDER — DIPHENHYDRAMINE HYDROCHLORIDE 50 MG/ML
50 INJECTION, SOLUTION INTRAMUSCULAR; INTRAVENOUS AS NEEDED
Status: CANCELLED
Start: 2017-01-01

## 2017-01-01 RX ORDER — FENTANYL CITRATE 50 UG/ML
25-50 INJECTION, SOLUTION INTRAMUSCULAR; INTRAVENOUS
Status: DISCONTINUED | OUTPATIENT
Start: 2017-01-01 | End: 2017-01-01 | Stop reason: HOSPADM

## 2017-01-01 RX ORDER — SODIUM CHLORIDE 9 MG/ML
500 INJECTION, SOLUTION INTRAVENOUS CONTINUOUS
Status: DISCONTINUED | OUTPATIENT
Start: 2017-01-01 | End: 2017-01-01 | Stop reason: HOSPADM

## 2017-01-01 RX ORDER — HEPARIN SODIUM (PORCINE) LOCK FLUSH IV SOLN 100 UNIT/ML 100 UNIT/ML
300 SOLUTION INTRAVENOUS AS NEEDED
Status: DISCONTINUED | OUTPATIENT
Start: 2017-01-01 | End: 2017-01-01 | Stop reason: HOSPADM

## 2017-01-01 RX ORDER — METRONIDAZOLE 500 MG/1
500 TABLET ORAL 2 TIMES DAILY
Qty: 20 TAB | Refills: 0 | Status: SHIPPED | OUTPATIENT
Start: 2017-01-01 | End: 2017-01-01

## 2017-01-01 RX ORDER — HEPARIN 100 UNIT/ML
500 SYRINGE INTRAVENOUS AS NEEDED
Status: COMPLETED | OUTPATIENT
Start: 2017-01-01 | End: 2017-01-01

## 2017-01-01 RX ORDER — MIDAZOLAM HYDROCHLORIDE 1 MG/ML
.25-5 INJECTION, SOLUTION INTRAMUSCULAR; INTRAVENOUS
Status: DISCONTINUED | OUTPATIENT
Start: 2017-01-01 | End: 2017-01-01 | Stop reason: HOSPADM

## 2017-01-01 RX ORDER — BENZONATATE 200 MG/1
200 CAPSULE ORAL
Qty: 30 CAP | Refills: 0 | Status: SHIPPED | OUTPATIENT
Start: 2017-01-01 | End: 2017-01-01 | Stop reason: SDUPTHER

## 2017-01-01 RX ORDER — LIDOCAINE HYDROCHLORIDE AND EPINEPHRINE 20; 5 MG/ML; UG/ML
1-10 INJECTION, SOLUTION EPIDURAL; INFILTRATION; INTRACAUDAL; PERINEURAL ONCE
Status: COMPLETED | OUTPATIENT
Start: 2017-01-01 | End: 2017-01-01

## 2017-01-01 RX ORDER — HEPARIN SODIUM (PORCINE) LOCK FLUSH IV SOLN 100 UNIT/ML 100 UNIT/ML
500 SOLUTION INTRAVENOUS ONCE
Status: COMPLETED | OUTPATIENT
Start: 2017-01-01 | End: 2017-01-01

## 2017-01-01 RX ORDER — HYDROCORTISONE SODIUM SUCCINATE 100 MG/2ML
100 INJECTION, POWDER, FOR SOLUTION INTRAMUSCULAR; INTRAVENOUS AS NEEDED
Status: CANCELLED | OUTPATIENT
Start: 2017-01-01

## 2017-01-01 RX ORDER — HYDROCODONE BITARTRATE AND ACETAMINOPHEN 5; 325 MG/1; MG/1
1 TABLET ORAL
Status: DISCONTINUED | OUTPATIENT
Start: 2017-01-01 | End: 2017-01-01 | Stop reason: HOSPADM

## 2017-01-01 RX ORDER — EPINEPHRINE 1 MG/ML
0.3 INJECTION, SOLUTION, CONCENTRATE INTRAVENOUS AS NEEDED
Status: CANCELLED | OUTPATIENT
Start: 2017-01-01

## 2017-01-01 RX ORDER — MIDAZOLAM HYDROCHLORIDE 1 MG/ML
.5-2 INJECTION, SOLUTION INTRAMUSCULAR; INTRAVENOUS
Status: DISCONTINUED | OUTPATIENT
Start: 2017-01-01 | End: 2017-01-01 | Stop reason: HOSPADM

## 2017-01-01 RX ORDER — ALBUTEROL SULFATE 90 UG/1
2 AEROSOL, METERED RESPIRATORY (INHALATION)
Qty: 1 INHALER | Refills: 0 | Status: SHIPPED | OUTPATIENT
Start: 2017-01-01 | End: 2017-01-01

## 2017-01-01 RX ORDER — LIDOCAINE HYDROCHLORIDE 20 MG/ML
JELLY TOPICAL AS NEEDED
Status: DISCONTINUED | OUTPATIENT
Start: 2017-01-01 | End: 2017-01-01 | Stop reason: HOSPADM

## 2017-01-01 RX ORDER — LIDOCAINE HYDROCHLORIDE 40 MG/ML
SOLUTION TOPICAL AS NEEDED
Status: DISCONTINUED | OUTPATIENT
Start: 2017-01-01 | End: 2017-01-01 | Stop reason: HOSPADM

## 2017-01-01 RX ORDER — ONDANSETRON HYDROCHLORIDE 8 MG/1
8 TABLET, FILM COATED ORAL
Qty: 60 TAB | Refills: 2 | Status: SHIPPED | OUTPATIENT
Start: 2017-01-01 | End: 2017-01-01 | Stop reason: SDUPTHER

## 2017-01-01 RX ORDER — PROCHLORPERAZINE EDISYLATE 5 MG/ML
10 INJECTION INTRAMUSCULAR; INTRAVENOUS
Status: DISPENSED | OUTPATIENT
Start: 2017-01-01 | End: 2017-01-01

## 2017-01-01 RX ORDER — LIDOCAINE HYDROCHLORIDE AND EPINEPHRINE 20; 10 MG/ML; UG/ML
20-200 INJECTION, SOLUTION INFILTRATION; PERINEURAL ONCE
Status: DISCONTINUED | OUTPATIENT
Start: 2017-01-01 | End: 2017-01-01 | Stop reason: ALTCHOICE

## 2017-01-01 RX ORDER — LORAZEPAM 2 MG/ML
0.5 INJECTION INTRAMUSCULAR
Status: ACTIVE | OUTPATIENT
Start: 2017-01-01 | End: 2017-01-01

## 2017-01-01 RX ORDER — SODIUM CHLORIDE 0.9 % (FLUSH) 0.9 %
5-10 SYRINGE (ML) INJECTION AS NEEDED
Status: DISCONTINUED | OUTPATIENT
Start: 2017-01-01 | End: 2017-01-01 | Stop reason: HOSPADM

## 2017-01-01 RX ORDER — METOCLOPRAMIDE HYDROCHLORIDE 5 MG/ML
10 INJECTION INTRAMUSCULAR; INTRAVENOUS ONCE
Status: CANCELLED
Start: 2017-01-01 | End: 2017-01-01

## 2017-01-01 RX ORDER — SODIUM CHLORIDE 9 MG/ML
25 INJECTION, SOLUTION INTRAVENOUS CONTINUOUS
Status: DISCONTINUED | OUTPATIENT
Start: 2017-01-01 | End: 2017-01-01 | Stop reason: HOSPADM

## 2017-01-01 RX ORDER — SODIUM CHLORIDE 9 MG/ML
100 INJECTION, SOLUTION INTRAVENOUS CONTINUOUS
Status: ACTIVE | OUTPATIENT
Start: 2017-01-01 | End: 2017-01-01

## 2017-01-01 RX ORDER — DIPHENHYDRAMINE HYDROCHLORIDE 50 MG/ML
25 INJECTION, SOLUTION INTRAMUSCULAR; INTRAVENOUS
Status: ACTIVE | OUTPATIENT
Start: 2017-01-01 | End: 2017-01-01

## 2017-01-01 RX ORDER — SODIUM CHLORIDE 0.9 % (FLUSH) 0.9 %
10-30 SYRINGE (ML) INJECTION AS NEEDED
Status: DISCONTINUED | OUTPATIENT
Start: 2017-01-01 | End: 2017-01-01 | Stop reason: HOSPADM

## 2017-01-01 RX ORDER — HYDROCODONE BITARTRATE AND HOMATROPINE METHYLBROMIDE 1.5; 5 MG/5ML; MG/5ML
5 SYRUP ORAL 4 TIMES DAILY
Qty: 240 ML | Refills: 0 | OUTPATIENT
Start: 2017-01-01 | End: 2017-01-01 | Stop reason: SDUPTHER

## 2017-01-01 RX ORDER — AZITHROMYCIN 250 MG/1
1000 TABLET, FILM COATED ORAL
Status: COMPLETED | OUTPATIENT
Start: 2017-01-01 | End: 2017-01-01

## 2017-01-01 RX ORDER — LIDOCAINE HYDROCHLORIDE 20 MG/ML
20-200 INJECTION, SOLUTION INFILTRATION; PERINEURAL ONCE
Status: COMPLETED | OUTPATIENT
Start: 2017-01-01 | End: 2017-01-01

## 2017-01-01 RX ORDER — AMLODIPINE BESYLATE 10 MG/1
10 TABLET ORAL DAILY
Qty: 30 TAB | Refills: 0 | Status: SHIPPED | OUTPATIENT
Start: 2017-01-01 | End: 2017-01-01

## 2017-01-01 RX ORDER — LORAZEPAM 2 MG/ML
1 INJECTION INTRAMUSCULAR ONCE
Status: COMPLETED | OUTPATIENT
Start: 2017-01-01 | End: 2017-01-01

## 2017-01-01 RX ORDER — ONDANSETRON 2 MG/ML
8 INJECTION INTRAMUSCULAR; INTRAVENOUS ONCE
Status: CANCELLED | OUTPATIENT
Start: 2017-01-01 | End: 2017-01-01

## 2017-01-01 RX ORDER — HEPARIN 100 UNIT/ML
300 SYRINGE INTRAVENOUS AS NEEDED
Status: DISPENSED | OUTPATIENT
Start: 2017-01-01 | End: 2017-01-01

## 2017-01-01 RX ORDER — HEPARIN SODIUM 200 [USP'U]/100ML
1000 INJECTION, SOLUTION INTRAVENOUS ONCE
Status: COMPLETED | OUTPATIENT
Start: 2017-01-01 | End: 2017-01-01

## 2017-01-01 RX ORDER — ONDANSETRON 2 MG/ML
8 INJECTION INTRAMUSCULAR; INTRAVENOUS AS NEEDED
Status: DISPENSED | OUTPATIENT
Start: 2017-01-01 | End: 2017-01-01

## 2017-01-01 RX ORDER — HEPARIN 100 UNIT/ML
500 SYRINGE INTRAVENOUS ONCE
Status: ACTIVE | OUTPATIENT
Start: 2017-01-01 | End: 2017-01-01

## 2017-01-01 RX ADMIN — IOPAMIDOL 80 ML: 755 INJECTION, SOLUTION INTRAVENOUS at 12:10

## 2017-01-01 RX ADMIN — ETOPOSIDE 177 MG: 20 INJECTION, SOLUTION, CONCENTRATE INTRAVENOUS at 14:28

## 2017-01-01 RX ADMIN — SODIUM CHLORIDE, PRESERVATIVE FREE 500 UNITS: 5 INJECTION INTRAVENOUS at 17:14

## 2017-01-01 RX ADMIN — DEXAMETHASONE SODIUM PHOSPHATE 10 MG: 10 INJECTION INTRAMUSCULAR; INTRAVENOUS at 14:22

## 2017-01-01 RX ADMIN — Medication 10 ML: at 16:12

## 2017-01-01 RX ADMIN — Medication 10 ML: at 19:16

## 2017-01-01 RX ADMIN — DEXAMETHASONE SODIUM PHOSPHATE 10 MG: 10 INJECTION INTRAMUSCULAR; INTRAVENOUS at 11:23

## 2017-01-01 RX ADMIN — SODIUM CHLORIDE, PRESERVATIVE FREE 500 UNITS: 5 INJECTION INTRAVENOUS at 16:24

## 2017-01-01 RX ADMIN — SODIUM CHLORIDE, PRESERVATIVE FREE 300 UNITS: 5 INJECTION INTRAVENOUS at 15:53

## 2017-01-01 RX ADMIN — FENTANYL CITRATE 50 MCG: 50 INJECTION, SOLUTION INTRAMUSCULAR; INTRAVENOUS at 10:49

## 2017-01-01 RX ADMIN — SODIUM CHLORIDE 500 ML: 900 INJECTION, SOLUTION INTRAVENOUS at 09:49

## 2017-01-01 RX ADMIN — SODIUM CHLORIDE 150 MG: 900 INJECTION, SOLUTION INTRAVENOUS at 11:16

## 2017-01-01 RX ADMIN — ETOPOSIDE 177 MG: 20 INJECTION, SOLUTION, CONCENTRATE INTRAVENOUS at 16:44

## 2017-01-01 RX ADMIN — CARBOPLATIN 664 MG: 10 INJECTION, SOLUTION INTRAVENOUS at 12:05

## 2017-01-01 RX ADMIN — Medication 10 ML: at 12:10

## 2017-01-01 RX ADMIN — POTASSIUM CHLORIDE 20 MEQ: 14.9 INJECTION, SOLUTION INTRAVENOUS at 14:51

## 2017-01-01 RX ADMIN — ONDANSETRON 8 MG: 2 INJECTION INTRAMUSCULAR; INTRAVENOUS at 11:10

## 2017-01-01 RX ADMIN — METOCLOPRAMIDE 10 MG: 5 INJECTION, SOLUTION INTRAMUSCULAR; INTRAVENOUS at 14:33

## 2017-01-01 RX ADMIN — FENTANYL CITRATE 50 MCG: 50 INJECTION, SOLUTION INTRAMUSCULAR; INTRAVENOUS at 10:42

## 2017-01-01 RX ADMIN — LORAZEPAM 1 MG: 2 INJECTION INTRAMUSCULAR; INTRAVENOUS at 13:38

## 2017-01-01 RX ADMIN — SODIUM CHLORIDE 500 ML: 900 INJECTION, SOLUTION INTRAVENOUS at 10:50

## 2017-01-01 RX ADMIN — ETOPOSIDE 177 MG: 20 INJECTION, SOLUTION, CONCENTRATE INTRAVENOUS at 13:40

## 2017-01-01 RX ADMIN — SODIUM CHLORIDE, PRESERVATIVE FREE 500 UNITS: 5 INJECTION INTRAVENOUS at 16:18

## 2017-01-01 RX ADMIN — SODIUM CHLORIDE 500 ML: 900 INJECTION, SOLUTION INTRAVENOUS at 11:05

## 2017-01-01 RX ADMIN — DEXAMETHASONE SODIUM PHOSPHATE 10 MG: 10 INJECTION INTRAMUSCULAR; INTRAVENOUS at 15:41

## 2017-01-01 RX ADMIN — GADOBENATE DIMEGLUMINE 10 ML: 529 INJECTION, SOLUTION INTRAVENOUS at 15:24

## 2017-01-01 RX ADMIN — HEPARIN SODIUM (PORCINE) LOCK FLUSH IV SOLN 100 UNIT/ML 500 UNITS: 100 SOLUTION at 10:28

## 2017-01-01 RX ADMIN — Medication 10 ML: at 17:12

## 2017-01-01 RX ADMIN — Medication 10 ML: at 07:26

## 2017-01-01 RX ADMIN — DEXAMETHASONE SODIUM PHOSPHATE 10 MG: 10 INJECTION INTRAMUSCULAR; INTRAVENOUS at 09:05

## 2017-01-01 RX ADMIN — MIDAZOLAM HYDROCHLORIDE 1 MG: 1 INJECTION, SOLUTION INTRAMUSCULAR; INTRAVENOUS at 10:53

## 2017-01-01 RX ADMIN — ONDANSETRON 8 MG: 2 INJECTION INTRAMUSCULAR; INTRAVENOUS at 11:07

## 2017-01-01 RX ADMIN — MIDAZOLAM HYDROCHLORIDE 1 MG: 1 INJECTION, SOLUTION INTRAMUSCULAR; INTRAVENOUS at 11:08

## 2017-01-01 RX ADMIN — DEXAMETHASONE SODIUM PHOSPHATE 10 MG: 10 INJECTION INTRAMUSCULAR; INTRAVENOUS at 13:08

## 2017-01-01 RX ADMIN — Medication 10 ML: at 10:57

## 2017-01-01 RX ADMIN — MIDAZOLAM HYDROCHLORIDE 2 MG: 1 INJECTION, SOLUTION INTRAMUSCULAR; INTRAVENOUS at 10:38

## 2017-01-01 RX ADMIN — DIATRIZOATE MEGLUMINE AND DIATRIZOATE SODIUM 10 ML: 660; 100 LIQUID ORAL; RECTAL at 16:10

## 2017-01-01 RX ADMIN — SODIUM CHLORIDE, PRESERVATIVE FREE 500 UNITS: 5 INJECTION INTRAVENOUS at 13:50

## 2017-01-01 RX ADMIN — Medication 10 ML: at 14:20

## 2017-01-01 RX ADMIN — ETOPOSIDE 132 MG: 20 INJECTION, SOLUTION, CONCENTRATE INTRAVENOUS at 11:50

## 2017-01-01 RX ADMIN — METHYLPREDNISOLONE SODIUM SUCCINATE 60 MG: 125 INJECTION, POWDER, FOR SOLUTION INTRAMUSCULAR; INTRAVENOUS at 11:14

## 2017-01-01 RX ADMIN — CARBOPLATIN 371 MG: 10 INJECTION, SOLUTION INTRAVENOUS at 12:54

## 2017-01-01 RX ADMIN — Medication 10 ML: at 15:01

## 2017-01-01 RX ADMIN — MIDAZOLAM HYDROCHLORIDE 0.5 MG: 1 INJECTION, SOLUTION INTRAMUSCULAR; INTRAVENOUS at 10:28

## 2017-01-01 RX ADMIN — FENTANYL CITRATE 50 MCG: 50 INJECTION, SOLUTION INTRAMUSCULAR; INTRAVENOUS at 10:51

## 2017-01-01 RX ADMIN — SODIUM CHLORIDE 150 MG: 900 INJECTION, SOLUTION INTRAVENOUS at 11:30

## 2017-01-01 RX ADMIN — DEXAMETHASONE SODIUM PHOSPHATE 10 MG: 10 INJECTION INTRAMUSCULAR; INTRAVENOUS at 11:13

## 2017-01-01 RX ADMIN — DEXAMETHASONE SODIUM PHOSPHATE 10 MG: 10 INJECTION INTRAMUSCULAR; INTRAVENOUS at 16:11

## 2017-01-01 RX ADMIN — SODIUM CHLORIDE 500 ML: 900 INJECTION, SOLUTION INTRAVENOUS at 10:58

## 2017-01-01 RX ADMIN — Medication 10 ML: at 11:48

## 2017-01-01 RX ADMIN — AZITHROMYCIN 1000 MG: 250 TABLET, FILM COATED ORAL at 14:08

## 2017-01-01 RX ADMIN — Medication 10 ML: at 11:01

## 2017-01-01 RX ADMIN — DEXAMETHASONE SODIUM PHOSPHATE 10 MG: 10 INJECTION INTRAMUSCULAR; INTRAVENOUS at 13:36

## 2017-01-01 RX ADMIN — SODIUM CHLORIDE, PRESERVATIVE FREE 500 UNITS: 5 INJECTION INTRAVENOUS at 15:27

## 2017-01-01 RX ADMIN — ETOPOSIDE 132 MG: 20 INJECTION, SOLUTION, CONCENTRATE INTRAVENOUS at 15:10

## 2017-01-01 RX ADMIN — LIDOCAINE HYDROCHLORIDE 1 ML: 20 JELLY TOPICAL at 10:40

## 2017-01-01 RX ADMIN — DEXAMETHASONE SODIUM PHOSPHATE 10 MG: 10 INJECTION INTRAMUSCULAR; INTRAVENOUS at 14:32

## 2017-01-01 RX ADMIN — Medication 10 ML: at 16:10

## 2017-01-01 RX ADMIN — Medication 10 ML: at 14:44

## 2017-01-01 RX ADMIN — LIDOCAINE HYDROCHLORIDE 7 ML: 40 SOLUTION TOPICAL at 10:39

## 2017-01-01 RX ADMIN — Medication 10 ML: at 15:27

## 2017-01-01 RX ADMIN — FENTANYL CITRATE 50 MCG: 50 INJECTION, SOLUTION INTRAMUSCULAR; INTRAVENOUS at 10:55

## 2017-01-01 RX ADMIN — Medication 10 ML: at 14:55

## 2017-01-01 RX ADMIN — IPRATROPIUM BROMIDE AND ALBUTEROL SULFATE 3 ML: 2.5; .5 SOLUTION RESPIRATORY (INHALATION) at 09:39

## 2017-01-01 RX ADMIN — FENTANYL CITRATE 50 MCG: 50 INJECTION, SOLUTION INTRAMUSCULAR; INTRAVENOUS at 10:46

## 2017-01-01 RX ADMIN — Medication 10 ML: at 15:52

## 2017-01-01 RX ADMIN — SODIUM CHLORIDE, PRESERVATIVE FREE 500 UNITS: 5 INJECTION INTRAVENOUS at 11:02

## 2017-01-01 RX ADMIN — SODIUM CHLORIDE 500 ML: 900 INJECTION, SOLUTION INTRAVENOUS at 12:55

## 2017-01-01 RX ADMIN — SODIUM CHLORIDE 150 MG: 900 INJECTION, SOLUTION INTRAVENOUS at 07:45

## 2017-01-01 RX ADMIN — Medication 10 ML: at 16:18

## 2017-01-01 RX ADMIN — Medication 10 ML: at 17:15

## 2017-01-01 RX ADMIN — CARBOPLATIN 581 MG: 10 INJECTION, SOLUTION INTRAVENOUS at 08:39

## 2017-01-01 RX ADMIN — LIDOCAINE HYDROCHLORIDE 100 MG: 20 INJECTION, SOLUTION INFILTRATION; PERINEURAL at 10:21

## 2017-01-01 RX ADMIN — MIDAZOLAM HYDROCHLORIDE 0.5 MG: 1 INJECTION, SOLUTION INTRAMUSCULAR; INTRAVENOUS at 10:12

## 2017-01-01 RX ADMIN — ETOPOSIDE 177 MG: 20 INJECTION, SOLUTION, CONCENTRATE INTRAVENOUS at 16:00

## 2017-01-01 RX ADMIN — DIATRIZOATE MEGLUMINE AND DIATRIZOATE SODIUM 10 ML: 660; 100 LIQUID ORAL; RECTAL at 13:53

## 2017-01-01 RX ADMIN — SODIUM CHLORIDE 500 ML: 900 INJECTION, SOLUTION INTRAVENOUS at 07:26

## 2017-01-01 RX ADMIN — MIDAZOLAM HYDROCHLORIDE 1 MG: 1 INJECTION, SOLUTION INTRAMUSCULAR; INTRAVENOUS at 09:58

## 2017-01-01 RX ADMIN — Medication 10 ML: at 15:24

## 2017-01-01 RX ADMIN — MIDAZOLAM HYDROCHLORIDE 0.5 MG: 1 INJECTION, SOLUTION INTRAMUSCULAR; INTRAVENOUS at 10:32

## 2017-01-01 RX ADMIN — ETOPOSIDE 177 MG: 20 INJECTION, SOLUTION, CONCENTRATE INTRAVENOUS at 13:10

## 2017-01-01 RX ADMIN — SODIUM CHLORIDE 500 ML: 900 INJECTION, SOLUTION INTRAVENOUS at 15:10

## 2017-01-01 RX ADMIN — SODIUM CHLORIDE 500 ML: 900 INJECTION, SOLUTION INTRAVENOUS at 14:34

## 2017-01-01 RX ADMIN — ONDANSETRON 8 MG: 2 INJECTION INTRAMUSCULAR; INTRAVENOUS at 11:21

## 2017-01-01 RX ADMIN — ONDANSETRON 8 MG: 2 INJECTION INTRAMUSCULAR; INTRAVENOUS at 07:30

## 2017-01-01 RX ADMIN — SODIUM CHLORIDE, PRESERVATIVE FREE 500 UNITS: 5 INJECTION INTRAVENOUS at 17:15

## 2017-01-01 RX ADMIN — ONDANSETRON 8 MG: 2 INJECTION INTRAMUSCULAR; INTRAVENOUS at 13:14

## 2017-01-01 RX ADMIN — SODIUM CHLORIDE 100 ML: 900 INJECTION, SOLUTION INTRAVENOUS at 12:10

## 2017-01-01 RX ADMIN — SODIUM CHLORIDE 1000 ML: 900 INJECTION, SOLUTION INTRAVENOUS at 13:11

## 2017-01-01 RX ADMIN — Medication 10 ML: at 13:10

## 2017-01-01 RX ADMIN — SODIUM CHLORIDE 500 ML: 900 INJECTION, SOLUTION INTRAVENOUS at 13:12

## 2017-01-01 RX ADMIN — SODIUM CHLORIDE 500 ML: 900 INJECTION, SOLUTION INTRAVENOUS at 14:26

## 2017-01-01 RX ADMIN — SODIUM CHLORIDE 100 ML: 900 INJECTION, SOLUTION INTRAVENOUS at 16:10

## 2017-01-01 RX ADMIN — METOCLOPRAMIDE 10 MG: 5 INJECTION, SOLUTION INTRAMUSCULAR; INTRAVENOUS at 14:04

## 2017-01-01 RX ADMIN — DEXAMETHASONE SODIUM PHOSPHATE 10 MG: 10 INJECTION INTRAMUSCULAR; INTRAVENOUS at 07:31

## 2017-01-01 RX ADMIN — HEPARIN SODIUM 2000 UNITS: 200 INJECTION, SOLUTION INTRAVENOUS at 10:21

## 2017-01-01 RX ADMIN — DIATRIZOATE MEGLUMINE AND DIATRIZOATE SODIUM 15 ML: 660; 100 LIQUID ORAL; RECTAL at 14:50

## 2017-01-01 RX ADMIN — SODIUM BICARBONATE 2 ML: 0.2 INJECTION, SOLUTION INTRAVENOUS at 10:21

## 2017-01-01 RX ADMIN — METOCLOPRAMIDE 10 MG: 5 INJECTION, SOLUTION INTRAMUSCULAR; INTRAVENOUS at 16:10

## 2017-01-01 RX ADMIN — METOCLOPRAMIDE 10 MG: 5 INJECTION, SOLUTION INTRAMUSCULAR; INTRAVENOUS at 13:10

## 2017-01-01 RX ADMIN — LIDOCAINE HYDROCHLORIDE,EPINEPHRINE BITARTRATE 200 MG: 20; .005 INJECTION, SOLUTION EPIDURAL; INFILTRATION; INTRACAUDAL; PERINEURAL at 10:27

## 2017-01-01 RX ADMIN — Medication 10 ML: at 13:45

## 2017-01-01 RX ADMIN — METOCLOPRAMIDE 10 MG: 5 INJECTION, SOLUTION INTRAMUSCULAR; INTRAVENOUS at 14:24

## 2017-01-01 RX ADMIN — Medication 10 ML: at 14:22

## 2017-01-01 RX ADMIN — FENTANYL CITRATE 25 MCG: 50 INJECTION, SOLUTION INTRAMUSCULAR; INTRAVENOUS at 10:32

## 2017-01-01 RX ADMIN — MIDAZOLAM HYDROCHLORIDE 1 MG: 1 INJECTION, SOLUTION INTRAMUSCULAR; INTRAVENOUS at 10:47

## 2017-01-01 RX ADMIN — Medication 10 ML: at 15:10

## 2017-01-01 RX ADMIN — SODIUM CHLORIDE, PRESERVATIVE FREE 500 UNITS: 5 INJECTION INTRAVENOUS at 17:46

## 2017-01-01 RX ADMIN — DEXAMETHASONE SODIUM PHOSPHATE 10 MG: 10 INJECTION INTRAMUSCULAR; INTRAVENOUS at 12:52

## 2017-01-01 RX ADMIN — GADOBENATE DIMEGLUMINE 15 ML: 529 INJECTION, SOLUTION INTRAVENOUS at 19:16

## 2017-01-01 RX ADMIN — SODIUM CHLORIDE 500 ML: 900 INJECTION, SOLUTION INTRAVENOUS at 16:12

## 2017-01-01 RX ADMIN — SODIUM CHLORIDE 500 ML: 900 INJECTION, SOLUTION INTRAVENOUS at 13:38

## 2017-01-01 RX ADMIN — CEFTRIAXONE SODIUM 250 MG: 250 INJECTION, POWDER, FOR SOLUTION INTRAMUSCULAR; INTRAVENOUS at 14:09

## 2017-01-01 RX ADMIN — DEXAMETHASONE SODIUM PHOSPHATE 10 MG: 10 INJECTION INTRAMUSCULAR; INTRAVENOUS at 14:02

## 2017-01-01 RX ADMIN — Medication 10 ML: at 11:00

## 2017-01-01 RX ADMIN — SODIUM CHLORIDE, PRESERVATIVE FREE 300 UNITS: 5 INJECTION INTRAVENOUS at 15:02

## 2017-01-01 RX ADMIN — CEFAZOLIN 2 G: 1 INJECTION, POWDER, FOR SOLUTION INTRAMUSCULAR; INTRAVENOUS; PARENTERAL at 09:50

## 2017-01-01 RX ADMIN — Medication 10 ML: at 17:46

## 2017-01-01 RX ADMIN — SODIUM CHLORIDE 25 ML/HR: 900 INJECTION, SOLUTION INTRAVENOUS at 10:29

## 2017-01-01 RX ADMIN — FENTANYL CITRATE 25 MCG: 50 INJECTION, SOLUTION INTRAMUSCULAR; INTRAVENOUS at 10:29

## 2017-01-01 RX ADMIN — METOCLOPRAMIDE 10 MG: 5 INJECTION, SOLUTION INTRAMUSCULAR; INTRAVENOUS at 13:35

## 2017-01-01 RX ADMIN — DEXAMETHASONE SODIUM PHOSPHATE 10 MG: 10 INJECTION INTRAMUSCULAR; INTRAVENOUS at 13:12

## 2017-01-01 RX ADMIN — Medication 10 ML: at 12:30

## 2017-01-01 RX ADMIN — ETOPOSIDE 177 MG: 20 INJECTION, SOLUTION, CONCENTRATE INTRAVENOUS at 09:42

## 2017-01-01 RX ADMIN — SODIUM CHLORIDE, PRESERVATIVE FREE 300 UNITS: 5 INJECTION INTRAVENOUS at 14:55

## 2017-01-01 RX ADMIN — METOCLOPRAMIDE 10 MG: 5 INJECTION, SOLUTION INTRAMUSCULAR; INTRAVENOUS at 15:43

## 2017-01-01 RX ADMIN — SODIUM CHLORIDE, PRESERVATIVE FREE 500 UNITS: 5 INJECTION INTRAVENOUS at 13:45

## 2017-01-01 RX ADMIN — SODIUM CHLORIDE 150 MG: 900 INJECTION, SOLUTION INTRAVENOUS at 11:25

## 2017-01-01 RX ADMIN — FENTANYL CITRATE 50 MCG: 50 INJECTION, SOLUTION INTRAMUSCULAR; INTRAVENOUS at 09:49

## 2017-01-01 RX ADMIN — ETOPOSIDE 177 MG: 20 INJECTION, SOLUTION, CONCENTRATE INTRAVENOUS at 13:44

## 2017-01-01 RX ADMIN — FENTANYL CITRATE 25 MCG: 50 INJECTION, SOLUTION INTRAMUSCULAR; INTRAVENOUS at 10:12

## 2017-01-01 RX ADMIN — DEXAMETHASONE SODIUM PHOSPHATE 10 MG: 10 INJECTION INTRAMUSCULAR; INTRAVENOUS at 11:08

## 2017-01-01 RX ADMIN — SODIUM CHLORIDE, PRESERVATIVE FREE 500 UNITS: 5 INJECTION INTRAVENOUS at 14:45

## 2017-01-01 RX ADMIN — FENTANYL CITRATE 50 MCG: 50 INJECTION, SOLUTION INTRAMUSCULAR; INTRAVENOUS at 11:07

## 2017-01-01 RX ADMIN — Medication 10 ML: at 12:50

## 2017-01-01 RX ADMIN — ONDANSETRON 8 MG: 2 INJECTION INTRAMUSCULAR; INTRAVENOUS at 15:45

## 2017-01-01 RX ADMIN — Medication 10 ML: at 14:12

## 2017-01-01 RX ADMIN — Medication 10 ML: at 13:50

## 2017-01-01 RX ADMIN — Medication 500 UNITS: at 14:12

## 2017-01-01 RX ADMIN — PROMETHAZINE HYDROCHLORIDE 6.25 MG: 25 INJECTION INTRAMUSCULAR; INTRAVENOUS at 11:04

## 2017-01-01 RX ADMIN — ETOPOSIDE 132 MG: 20 INJECTION, SOLUTION, CONCENTRATE INTRAVENOUS at 14:53

## 2017-01-01 RX ADMIN — SODIUM CHLORIDE 500 ML: 900 INJECTION, SOLUTION INTRAVENOUS at 14:06

## 2017-01-01 RX ADMIN — METOCLOPRAMIDE 10 MG: 5 INJECTION, SOLUTION INTRAMUSCULAR; INTRAVENOUS at 12:50

## 2017-01-01 RX ADMIN — FENTANYL CITRATE 50 MCG: 50 INJECTION, SOLUTION INTRAMUSCULAR; INTRAVENOUS at 10:39

## 2017-01-01 RX ADMIN — CARBOPLATIN 549.6 MG: 10 INJECTION, SOLUTION INTRAVENOUS at 11:40

## 2017-01-01 RX ADMIN — ETOPOSIDE 177 MG: 20 INJECTION, SOLUTION, CONCENTRATE INTRAVENOUS at 12:43

## 2017-01-01 RX ADMIN — IOPAMIDOL 100 ML: 755 INJECTION, SOLUTION INTRAVENOUS at 16:10

## 2017-01-01 RX ADMIN — Medication 10 ML: at 10:50

## 2017-01-01 RX ADMIN — Medication 10 ML: at 12:52

## 2017-06-19 NOTE — ED PROVIDER NOTES
HPI Comments: Patient is here with pain to her left lower back, left hip and left leg that started abruptly and month ago. She woke up with the pain. She did not have an injury. She was ambulatory to the room here today but states it does hurt to ambulate. She has not had any trouble with urination or bowel movements, chest pain, shortness of breath, abdominal pain, fever, rash or other symptoms. She does not have a primary care physician nor does she have health insurance. She states she does have a history of hypertension but has not had her medication in many years. Patient also has had cough and cold symptoms that have been going on for the last month now and is wanting to be evaluated for that as well. She does smoke. No swelling of her legs. No long car rides, no history of blood clots. No orthopnea or dyspnea on exertion. Patient has smoked for 40 years. Patient is a 63 y.o. female presenting with leg pain and medication refill. The history is provided by the patient. Leg Pain    This is a new problem. The current episode started more than 1 week ago. The problem occurs constantly. The problem has been gradually worsening. The pain is present in the left hip, left upper leg and left lower leg. The quality of the pain is described as aching. The pain is at a severity of 10/10. The pain is severe. Associated symptoms include stiffness and back pain. Pertinent negatives include no numbness, full range of motion, no tingling, no itching and no neck pain. The symptoms are aggravated by movement, palpation, standing and activity. She has tried nothing for the symptoms. There has been no history of extremity trauma. Medication Refill          Past Medical History:   Diagnosis Date    Hypertension        History reviewed. No pertinent surgical history. History reviewed. No pertinent family history.     Social History     Social History    Marital status: LEGALLY      Spouse name: N/A   [facility] Number of children: N/A    Years of education: N/A     Occupational History    Not on file. Social History Main Topics    Smoking status: Current Every Day Smoker     Packs/day: 1.00     Years: 1.00    Smokeless tobacco: Never Used    Alcohol use 3.0 oz/week     6 Cans of beer per week    Drug use: Yes     Special: Cocaine    Sexual activity: Not on file     Other Topics Concern    Not on file     Social History Narrative         ALLERGIES: Review of patient's allergies indicates no known allergies. Review of Systems   Constitutional: Negative. HENT: Negative. Eyes: Negative. Respiratory: Negative. Cardiovascular: Negative. Gastrointestinal: Negative. Genitourinary: Negative. Musculoskeletal: Positive for back pain and stiffness. Negative for neck pain. Left leg pain   Skin: Negative. Negative for itching. Neurological: Negative. Negative for tingling and numbness. Psychiatric/Behavioral: Negative. All other systems reviewed and are negative. Vitals:    06/19/17 0852   BP: (!) 190/93   Pulse: 88   Resp: 16   Temp: 98.1 °F (36.7 °C)   SpO2: 96%   Weight: 95.3 kg (210 lb)   Height: 5' 7\" (1.702 m)            Physical Exam   Constitutional: She is oriented to person, place, and time. She appears well-developed and well-nourished. HENT:   Head: Normocephalic and atraumatic. Right Ear: External ear normal.   Left Ear: External ear normal.   Nose: Nose normal.   Mouth/Throat: Oropharynx is clear and moist.   Eyes: Conjunctivae and EOM are normal. Pupils are equal, round, and reactive to light. Neck: Normal range of motion. Neck supple. Cardiovascular: Normal rate, regular rhythm, normal heart sounds and intact distal pulses. Pulmonary/Chest: Effort normal and breath sounds normal.   Abdominal: Soft. Bowel sounds are normal.   Musculoskeletal: Normal range of motion. Left hip: She exhibits tenderness and bony tenderness.  She exhibits normal range of motion, normal strength, no swelling, no crepitus, no deformity and no laceration. Back:         Legs:  Neurological: She is alert and oriented to person, place, and time. She has normal strength and normal reflexes. No cranial nerve deficit or sensory deficit. She displays a negative Romberg sign. GCS eye subscore is 4. GCS verbal subscore is 5. GCS motor subscore is 6. Reflex Scores:       Tricep reflexes are 2+ on the right side and 2+ on the left side. Bicep reflexes are 2+ on the right side and 2+ on the left side. Brachioradialis reflexes are 2+ on the right side and 2+ on the left side. Patellar reflexes are 2+ on the right side and 2+ on the left side. Achilles reflexes are 2+ on the right side and 2+ on the left side. Skin: Skin is warm and dry. Psychiatric: She has a normal mood and affect. Her behavior is normal. Judgment and thought content normal.   Nursing note and vitals reviewed. MDM  Number of Diagnoses or Management Options     Amount and/or Complexity of Data Reviewed  Tests in the radiology section of CPT®: ordered and reviewed    Risk of Complications, Morbidity, and/or Mortality  Presenting problems: moderate  Diagnostic procedures: moderate  Management options: moderate      ED Course       Procedures      9:50 AM Spoke with Dr. Lul Thompson regarding patient. Patient does not have imaging at another hospital and CT chest is irregular, last visit here was 2011 with blood work. Will add blood work and CT. I have asked Erick Cole,  to secure follow up for her but it is not done yet and I am concerned with the abnormal CXR that she will be lost to follow up. 1:58 PM Spoke with Dr. Torito Siu regarding patient, he will have his intact people call me back with an appointment. Patient was also treated here for gonorrhea and Chlamydia as she did have Trichomonas in her urine.   I have advised her against having intercourse for at least 10 days and have her partner checked and treated as well. I'll send her with Flagyl at home. The patient was observed in the ED. Results Reviewed:      Recent Results (from the past 24 hour(s))   CBC WITH AUTOMATED DIFF    Collection Time: 06/19/17 10:10 AM   Result Value Ref Range    WBC 6.9 4.3 - 11.1 K/uL    RBC 4.78 4.05 - 5.25 M/uL    HGB 13.9 11.7 - 15.4 g/dL    HCT 41.8 35.8 - 46.3 %    MCV 87.4 79.6 - 97.8 FL    MCH 29.1 26.1 - 32.9 PG    MCHC 33.3 31.4 - 35.0 g/dL    RDW 14.0 11.9 - 14.6 %    PLATELET 112 039 - 564 K/uL    MPV 10.9 10.8 - 14.1 FL    DF AUTOMATED      NEUTROPHILS 48 43 - 78 %    LYMPHOCYTES 41 13 - 44 %    MONOCYTES 8 4.0 - 12.0 %    EOSINOPHILS 3 0.5 - 7.8 %    BASOPHILS 0 0.0 - 2.0 %    IMMATURE GRANULOCYTES 0.3 0.0 - 5.0 %    ABS. NEUTROPHILS 3.3 1.7 - 8.2 K/UL    ABS. LYMPHOCYTES 2.8 0.5 - 4.6 K/UL    ABS. MONOCYTES 0.6 0.1 - 1.3 K/UL    ABS. EOSINOPHILS 0.2 0.0 - 0.8 K/UL    ABS. BASOPHILS 0.0 0.0 - 0.2 K/UL    ABS. IMM. GRANS. 0.0 0.0 - 0.5 K/UL   METABOLIC PANEL, COMPREHENSIVE    Collection Time: 06/19/17 10:10 AM   Result Value Ref Range    Sodium 144 136 - 145 mmol/L    Potassium 4.6 3.5 - 5.1 mmol/L    Chloride 106 98 - 107 mmol/L    CO2 28 21 - 32 mmol/L    Anion gap 10 7 - 16 mmol/L    Glucose 92 65 - 100 mg/dL    BUN 13 6 - 23 MG/DL    Creatinine 1.20 (H) 0.6 - 1.0 MG/DL    GFR est AA 59 (L) >60 ml/min/1.73m2    GFR est non-AA 49 (L) >60 ml/min/1.73m2    Calcium 8.7 8.3 - 10.4 MG/DL    Bilirubin, total 0.5 0.2 - 1.1 MG/DL    ALT (SGPT) 17 12 - 65 U/L    AST (SGOT) 30 15 - 37 U/L    Alk.  phosphatase 58 50 - 136 U/L    Protein, total 8.1 6.3 - 8.2 g/dL    Albumin 3.5 3.5 - 5.0 g/dL    Globulin 4.6 (H) 2.3 - 3.5 g/dL    A-G Ratio 0.8 (L) 1.2 - 3.5     POC TROPONIN-I    Collection Time: 06/19/17 10:16 AM   Result Value Ref Range    Troponin-I (POC) 0.01 0.0 - 0.08 ng/ml   EKG, 12 LEAD, INITIAL    Collection Time: 06/19/17 10:19 AM   Result Value Ref Range    Ventricular Rate 84 BPM    Atrial Rate 84 BPM    P-R Interval 176 ms    QRS Duration 78 ms    Q-T Interval 370 ms    QTC Calculation (Bezet) 437 ms    Calculated P Axis 66 degrees    Calculated R Axis 77 degrees    Calculated T Axis 54 degrees    Diagnosis       Normal sinus rhythm  Anteroseptal infarct , age undetermined  Abnormal ECG  No previous ECGs available  Confirmed by Felisa Arriola ([id number]) on 6/19/2017 12:24:00 PM     URINE MICROSCOPIC    Collection Time: 06/19/17 12:00 PM   Result Value Ref Range    WBC 0-3 0 /hpf    RBC 0 0 /hpf    Epithelial cells 0-3 0 /hpf    Bacteria 0 0 /hpf    Casts 0 0 /lpf    Crystals, urine 0 0 /LPF    Mucus 0 0 /lpf    Trichomonas OCCASIONAL       CT CHEST W CONT   Final Result   Impression:  Right hilar mass, right lung nodule, mediastinal lymphadenopathy   suspicious for malignancy. XR SPINE LUMB 2 OR 3 V   Final Result   IMPRESSION: No acute osseous abnormality. Degenerative change. XR CHEST PA LAT   Final Result   IMPRESSION:    1. No acute airspace disease. 2. Asymmetric mild enlargement of the right hilum, possibly vascular shadows but   not definitively characterized. Recommend correlation with any previous outside   chest imaging if available. Otherwise follow-up CT could be performed. XR HIP LT W OR WO PELV 2-3 VWS   Final Result   IMPRESSION: No acute osseous abnormality. 2:12 PM Spoke with Dr. Hipolito Veliz office, they will see patient 06/26/17 at 1 pm for labs and 1:30 pm for an appointment. Patient informed and will follow up with them in one week. Return to the ED sooner if any change or worse. Drink plenty of fluids and rest. Patient informed of all of the information and opportunity for questions given. I discussed the results of all labs, procedures, radiographs, and treatments with the patient and available family. Treatment plan is agreed upon and the patient is ready for discharge.   All voiced understanding of the discharge plan and medication instructions or changes as appropriate. Questions about treatment in the ED were answered. All were encouraged to return should symptoms worsen or new problems develop.

## 2017-06-19 NOTE — ED NOTES
Discharge instructions, work note, and prescriptions provided and explained to the pt. Opportunity for questions provided. A signed copy of AVS was placed in chart.

## 2017-06-19 NOTE — DISCHARGE INSTRUCTIONS
Sciatica: Care Instructions  Your Care Instructions    Sciatica (say \"uoc-YH-sn-kuh\") is an irritation of one of the sciatic nerves, which come from the spinal cord in the lower back. The sciatic nerves and their branches extend down through the buttock to the foot. Sciatica can develop when an injured disc in the back presses against a spinal nerve root. Its main symptom is pain, numbness, or weakness that is often worse in the leg or foot than in the back. Sciatica often will improve and go away with time. Early treatment usually includes medicines and exercises to relieve pain. Follow-up care is a key part of your treatment and safety. Be sure to make and go to all appointments, and call your doctor if you are having problems. It's also a good idea to know your test results and keep a list of the medicines you take. How can you care for yourself at home? · Take pain medicines exactly as directed. ¨ If the doctor gave you a prescription medicine for pain, take it as prescribed. ¨ If you are not taking a prescription pain medicine, ask your doctor if you can take an over-the-counter medicine. · Use heat or ice to relieve pain. ¨ To apply heat, put a warm water bottle, heating pad set on low, or warm cloth on your back. Do not go to sleep with a heating pad on your skin. ¨ To use ice, put ice or a cold pack on the area for 10 to 20 minutes at a time. Put a thin cloth between the ice and your skin. · Avoid sitting if possible, unless it feels better than standing. · Alternate lying down with short walks. Increase your walking distance as you are able to without making your symptoms worse. · Do not do anything that makes your symptoms worse. When should you call for help? Call 911 anytime you think you may need emergency care. For example, call if:  · You are unable to move a leg at all.   Call your doctor now or seek immediate medical care if:  · You have new or worse symptoms in your legs or buttocks. Symptoms may include:  ¨ Numbness or tingling. ¨ Weakness. ¨ Pain. · You lose bladder or bowel control. Watch closely for changes in your health, and be sure to contact your doctor if:  · You are not getting better as expected. Where can you learn more? Go to http://nathen-landon.info/. Enter [id number] in the search box to learn more about \"Sciatica: Care Instructions. \"  Current as of: May 23, 2016  Content Version: 11.2  © 5980-6705 [employer]. Care instructions adapted under license by [employer] (which disclaims liability or warranty for this information). If you have questions about a medical condition or this instruction, always ask your healthcare professional. [address] any warranty or liability for your use of this information. Trichomoniasis: Care Instructions  Your Care Instructions  Trichomoniasis is a sexually transmitted infection (STI) that is spread by having sex with an infected partner. Trichomoniasis is commonly called trich (say \"trick\"). In women, trich may cause vaginal itching and a smelly discharge. But in many cases, especially in men, there are no symptoms. Rosalie Orr is treated so that you do not spread it to others. Both you and your sex partner or partners should be treated at the same time so you do not infect each other again. Trich may cause problems with pregnancy. Your doctor will talk with you about treatment for Trich if you are pregnant. Follow-up care is a key part of your treatment and safety. Be sure to make and go to all appointments, and call your doctor if you are having problems. Its also a good idea to know your test results and keep a list of the medicines you take. How can you care for yourself at home? · Take your antibiotics as directed. Do not stop taking them just because you feel better. You need to take the full course of antibiotics.   · Do not have sex while you are being treated. If your doctor gave you a single dose of antibiotics, do not have sex for one week after being treated and until your partner also has been treated. · Tell your sex partner (or partners) that he or she will also need to be tested and treated. · Use a cold water compress or cool baths to relieve itching. To prevent trichomoniasis in the future  · Use latex condoms every time you have sex. Use them from the beginning to the end of sexual contact. · Talk to your partner before having sex. Find out if he or she has or is at risk for trich or any other STI. Keep in mind that a person may be able to spread an STI even if he or she does not have symptoms. · Do not have sex if you are being treated for trich or any other STI. · Do not have sex with anyone who has symptoms of an STI, such as sores on the genitals or mouth. · Having one sex partner (who does not have STIs and does not have sex with anyone else) is a good way to avoid STIs. When should you call for help? Call your doctor now or seek immediate medical care if:  · You have unusual vaginal bleeding. · You have a fever. · You have new discharge from the vagina or penis. · You have pelvic pain. Watch closely for changes in your health, and be sure to contact your doctor if:  · You do not get better as expected. · You have any new symptoms or your symptoms get worse. Where can you learn more? Go to http://nathen-landon.info/. Enter D742 in the search box to learn more about \"Trichomoniasis: Care Instructions. \"  Current as of: May 27, 2016  Content Version: 11.2  © 5983-5217 [employer]. Care instructions adapted under license by [employer] (which disclaims liability or warranty for this information).  If you have questions about a medical condition or this instruction, always ask your healthcare professional. [address] any warranty or liability for your use of this information. Bronchitis: Care Instructions  Your Care Instructions    Bronchitis is inflammation of the bronchial tubes, which carry air to the lungs. The tubes swell and produce mucus, or phlegm. The mucus and inflamed bronchial tubes make you cough. You may have trouble breathing. Most cases of bronchitis are caused by viruses like those that cause colds. Antibiotics usually do not help and they may be harmful. Bronchitis usually develops rapidly and lasts about 2 to 3 weeks in otherwise healthy people. Follow-up care is a key part of your treatment and safety. Be sure to make and go to all appointments, and call your doctor if you are having problems. It's also a good idea to know your test results and keep a list of the medicines you take. How can you care for yourself at home? · Take all medicines exactly as prescribed. Call your doctor if you think you are having a problem with your medicine. · Get some extra rest.  · Take an over-the-counter pain medicine, such as acetaminophen (Tylenol), ibuprofen (Advil, Motrin), or naproxen (Aleve) to reduce fever and relieve body aches. Read and follow all instructions on the label. · Do not take two or more pain medicines at the same time unless the doctor told you to. Many pain medicines have acetaminophen, which is Tylenol. Too much acetaminophen (Tylenol) can be harmful. · Take an over-the-counter cough medicine that contains dextromethorphan to help quiet a dry, hacking cough so that you can sleep. Avoid cough medicines that have more than one active ingredient. Read and follow all instructions on the label. · Breathe moist air from a humidifier, hot shower, or sink filled with hot water. The heat and moisture will thin mucus so you can cough it out. · Do not smoke. Smoking can make bronchitis worse. If you need help quitting, talk to your doctor about stop-smoking programs and medicines. These can increase your chances of quitting for good.   When should you call for help? Call 911 anytime you think you may need emergency care. For example, call if:  · You have severe trouble breathing. Call your doctor now or seek immediate medical care if:  · You have new or worse trouble breathing. · You cough up dark brown or bloody mucus (sputum). · You have a new or higher fever. · You have a new rash. Watch closely for changes in your health, and be sure to contact your doctor if:  · You cough more deeply or more often, especially if you notice more mucus or a change in the color of your mucus. · You are not getting better as expected. Where can you learn more? Go to http://nathen-landon.info/. Enter H333 in the search box to learn more about \"Bronchitis: Care Instructions. \"  Current as of: May 23, 2016  Content Version: 11.2  © 4384-6311 [employer]. Care instructions adapted under license by [employer] (which disclaims liability or warranty for this information). If you have questions about a medical condition or this instruction, always ask your healthcare professional. [address] any warranty or liability for your use of this information. DASH Diet: Care Instructions  Your Care Instructions  The DASH diet is an eating plan that can help lower your blood pressure. DASH stands for Dietary Approaches to Stop Hypertension. Hypertension is high blood pressure. The DASH diet focuses on eating foods that are high in calcium, potassium, and magnesium. These nutrients can lower blood pressure. The foods that are highest in these nutrients are fruits, vegetables, low-fat dairy products, nuts, seeds, and legumes. But taking calcium, potassium, and magnesium supplements instead of eating foods that are high in those nutrients does not have the same effect. The DASH diet also includes whole grains, fish, and poultry.   The DASH diet is one of several lifestyle changes your doctor may recommend to lower your high blood pressure. Your doctor may also want you to decrease the amount of sodium in your diet. Lowering sodium while following the DASH diet can lower blood pressure even further than just the DASH diet alone. Follow-up care is a key part of your treatment and safety. Be sure to make and go to all appointments, and call your doctor if you are having problems. It's also a good idea to know your test results and keep a list of the medicines you take. How can you care for yourself at home? Following the DASH diet  · Eat 4 to 5 servings of fruit each day. A serving is 1 medium-sized piece of fruit, ½ cup chopped or canned fruit, 1/4 cup dried fruit, or 4 ounces (½ cup) of fruit juice. Choose fruit more often than fruit juice. · Eat 4 to 5 servings of vegetables each day. A serving is 1 cup of lettuce or raw leafy vegetables, ½ cup of chopped or cooked vegetables, or 4 ounces (½ cup) of vegetable juice. Choose vegetables more often than vegetable juice. · Get 2 to 3 servings of low-fat and fat-free dairy each day. A serving is 8 ounces of milk, 1 cup of yogurt, or 1 ½ ounces of cheese. · Eat 6 to 8 servings of grains each day. A serving is 1 slice of bread, 1 ounce of dry cereal, or ½ cup of cooked rice, pasta, or cooked cereal. Try to choose whole-grain products as much as possible. · Limit lean meat, poultry, and fish to 2 servings each day. A serving is 3 ounces, about the size of a deck of cards. · Eat 4 to 5 servings of nuts, seeds, and legumes (cooked dried beans, lentils, and split peas) each week. A serving is 1/3 cup of nuts, 2 tablespoons of seeds, or ½ cup of cooked beans or peas. · Limit fats and oils to 2 to 3 servings each day. A serving is 1 teaspoon of vegetable oil or 2 tablespoons of salad dressing. · Limit sweets and added sugars to 5 servings or less a week. A serving is 1 tablespoon jelly or jam, ½ cup sorbet, or 1 cup of lemonade.   · Eat less than 2,300 milligrams (mg) of sodium a day. If you limit your sodium to 1,500 mg a day, you can lower your blood pressure even more. Tips for success  · Start small. Do not try to make dramatic changes to your diet all at once. You might feel that you are missing out on your favorite foods and then be more likely to not follow the plan. Make small changes, and stick with them. Once those changes become habit, add a few more changes. · Try some of the following:  ¨ Make it a goal to eat a fruit or vegetable at every meal and at snacks. This will make it easy to get the recommended amount of fruits and vegetables each day. ¨ Try yogurt topped with fruit and nuts for a snack or healthy dessert. ¨ Add lettuce, tomato, cucumber, and onion to sandwiches. ¨ Combine a ready-made pizza crust with low-fat mozzarella cheese and lots of vegetable toppings. Try using tomatoes, squash, spinach, broccoli, carrots, cauliflower, and onions. ¨ Have a variety of cut-up vegetables with a low-fat dip as an appetizer instead of chips and dip. ¨ Sprinkle sunflower seeds or chopped almonds over salads. Or try adding chopped walnuts or almonds to cooked vegetables. ¨ Try some vegetarian meals using beans and peas. Add garbanzo or kidney beans to salads. Make burritos and tacos with mashed shelby beans or black beans. Where can you learn more? Go to http://nathen-landon.info/. Enter L858 in the search box to learn more about \"DASH Diet: Care Instructions. \"  Current as of: March 23, 2016  Content Version: 11.2  © 6711-4176 [employer]. Care instructions adapted under license by [employer] (which disclaims liability or warranty for this information). If you have questions about a medical condition or this instruction, always ask your healthcare professional. [place] [address] any warranty or liability for your use of this information.          High Blood Pressure: Care Instructions  Your Care Instructions  If your blood pressure is usually above 140/90, you have high blood pressure, or hypertension. That means the top number is 140 or higher or the bottom number is 90 or higher, or both. Despite what a lot of people think, high blood pressure usually doesn't cause headaches or make you feel dizzy or lightheaded. It usually has no symptoms. But it does increase your risk for heart attack, stroke, and kidney or eye damage. The higher your blood pressure, the more your risk increases. Your doctor will give you a goal for your blood pressure. Your goal will be based on your health and your age. An example of a goal is to keep your blood pressure below 140/90. Lifestyle changes, such as eating healthy and being active, are always important to help lower blood pressure. You might also take medicine to reach your blood pressure goal.  Follow-up care is a key part of your treatment and safety. Be sure to make and go to all appointments, and call your doctor if you are having problems. It's also a good idea to know your test results and keep a list of the medicines you take. How can you care for yourself at home? Medical treatment  · If you stop taking your medicine, your blood pressure will go back up. You may take one or more types of medicine to lower your blood pressure. Be safe with medicines. Take your medicine exactly as prescribed. Call your doctor if you think you are having a problem with your medicine. · Talk to your doctor before you start taking aspirin every day. Aspirin can help certain people lower their risk of a heart attack or stroke. But taking aspirin isn't right for everyone, because it can cause serious bleeding. · See your doctor regularly. You may need to see the doctor more often at first or until your blood pressure comes down. · If you are taking blood pressure medicine, talk to your doctor before you take decongestants or anti-inflammatory medicine, such as ibuprofen.  Some of these medicines can raise blood pressure. · Learn how to check your blood pressure at home. Lifestyle changes  · Stay at a healthy weight. This is especially important if you put on weight around the waist. Losing even 10 pounds can help you lower your blood pressure. · If your doctor recommends it, get more exercise. Walking is a good choice. Bit by bit, increase the amount you walk every day. Try for at least 30 minutes on most days of the week. You also may want to swim, bike, or do other activities. · Avoid or limit alcohol. Talk to your doctor about whether you can drink any alcohol. · Try to limit how much sodium you eat to less than 2,300 milligrams (mg) a day. Your doctor may ask you to try to eat less than 1,500 mg a day. · Eat plenty of fruits (such as bananas and oranges), vegetables, legumes, whole grains, and low-fat dairy products. · Lower the amount of saturated fat in your diet. Saturated fat is found in animal products such as milk, cheese, and meat. Limiting these foods may help you lose weight and also lower your risk for heart disease. · Do not smoke. Smoking increases your risk for heart attack and stroke. If you need help quitting, talk to your doctor about stop-smoking programs and medicines. These can increase your chances of quitting for good. When should you call for help? Call 911 anytime you think you may need emergency care. This may mean having symptoms that suggest that your blood pressure is causing a serious heart or blood vessel problem. Your blood pressure may be over 180/110. For example, call 911 if:  · You have symptoms of a heart attack. These may include:  ¨ Chest pain or pressure, or a strange feeling in the chest.  ¨ Sweating. ¨ Shortness of breath. ¨ Nausea or vomiting. ¨ Pain, pressure, or a strange feeling in the back, neck, jaw, or upper belly or in one or both shoulders or arms. ¨ Lightheadedness or sudden weakness.   ¨ A fast or irregular heartbeat. · You have symptoms of a stroke. These may include:  ¨ Sudden numbness, tingling, weakness, or loss of movement in your face, arm, or leg, especially on only one side of your body. ¨ Sudden vision changes. ¨ Sudden trouble speaking. ¨ Sudden confusion or trouble understanding simple statements. ¨ Sudden problems with walking or balance. ¨ A sudden, severe headache that is different from past headaches. · You have severe back or belly pain. Do not wait until your blood pressure comes down on its own. Get help right away. Call your doctor now or seek immediate care if:  · Your blood pressure is much higher than normal (such as 180/110 or higher), but you don't have symptoms. · You think high blood pressure is causing symptoms, such as:  ¨ Severe headache. ¨ Blurry vision. Watch closely for changes in your health, and be sure to contact your doctor if:  · Your blood pressure measures 140/90 or higher at least 2 times. That means the top number is 140 or higher or the bottom number is 90 or higher, or both. · You think you may be having side effects from your blood pressure medicine. · Your blood pressure is usually normal, but it goes above normal at least 2 times. Where can you learn more? Go to http://nathen-landon.info/. Enter A335 in the search box to learn more about \"High Blood Pressure: Care Instructions. \"  Current as of: August 8, 2016  Content Version: 11.2  © 5114-1948 [employer]. Care instructions adapted under license by [employer] (which disclaims liability or warranty for this information). If you have questions about a medical condition or this instruction, always ask your healthcare professional. [place] [address] any warranty or liability for your use of this information.

## 2017-06-19 NOTE — Clinical Note
Keep a check on BP, check it once or twice weekly with same blood pressure monitor, write the number down with date and time and take that with you to see a PCP for recheck of BP. Take all of the prednisone, use the inhaler as needed/directed, drink plen ty of fluids and return to the ED if worse. I have treated here for Alcon Yao and will have partner checked and treated as well. No intercourse for at least ten days. Follow up with the health department for further STD testing. Return to the ED if worse.  Drink  plenty of fluids and rest.

## 2017-06-19 NOTE — LETTER
[address] [facility] EMERGENCY DEPT [address] [id number] [phone] Work/School Note Date: 6/19/2017 To Whom It May concern: 
 
Nathalie Smith was seen and treated today in the emergency room by the following provider(s): 
Attending Provider: Elliot Lemon MD 
Physician Assistant: FRANKLIN Ortiz. Nathalie Smith may return to work on 06/20/17. Sincerely, FRANKLIN Ortiz

## 2017-06-19 NOTE — ED TRIAGE NOTES
Patient complains of left leg pain going down her entire leg for 1 month. Also states she has been out of her BP medication for 1 month and needs refill.

## 2017-06-26 PROBLEM — R91.8 MASS OF RIGHT LUNG: Status: ACTIVE | Noted: 2017-01-01

## 2017-07-13 NOTE — PROGRESS NOTES
Patient did not arrive for scheduled procedure today. She states she was unaware of this appointment. She was rescheduled on July 19 with 0930 arrival time. She is aware to remain NPO after MN the evening prior and bring a  with her. My phone number was given to her for questions. She will call me with any questions or concerns. She was instructed to take her amlodipine the am of procedure with a small sip of water.

## 2017-07-19 PROBLEM — R59.0 MEDIASTINAL ADENOPATHY: Status: ACTIVE | Noted: 2017-01-01

## 2017-07-19 PROBLEM — R91.1 LUNG NODULE: Status: ACTIVE | Noted: 2017-01-01

## 2017-07-19 PROBLEM — Z72.0 TOBACCO ABUSE: Status: ACTIVE | Noted: 2017-01-01

## 2017-07-19 PROBLEM — C34.81 MALIGNANT NEOPLASM OF OVERLAPPING SITES OF RIGHT LUNG (HCC): Status: ACTIVE | Noted: 2017-01-01

## 2017-07-19 NOTE — IP AVS SNAPSHOT
[address] 
[phone] Patient: Selena Gonzalez MRN: [id number] UAM:7/92/0970 You are allergic to the following No active allergies Recent Documentation Height Weight BMI OB Status Smoking Status 1.702 m 72.6 kg 25.06 kg/m2 Unknown Current Every Day Smoker Emergency Contacts Name Discharge Info Relation Home Work [facility] Brandon Mejía  Daughter [21] [phone] About your hospitalization You were admitted on:  July 19, 2017 You last received care in the:  [facility] You were discharged on:  July 19, 2017 Unit phone number:  [phone] Why you were hospitalized Your primary diagnosis was:  Lung Nodule Your diagnoses also included:  Mediastinal Adenopathy, Tobacco Abuse, Malignant Neoplasm Of Overlapping Sites Of Right Lung (Hcc) Providers Seen During Your Hospitalizations Provider Role Specialty Primary office phone Colby Flores MD Attending Provider Pulmonary Disease [phone] Your Primary Care Physician (PCP) Primary Care Physician Office Phone Office Fax NONE ** None ** ** None ** Follow-up Information None Your Appointments Tuesday July 25, 2017  7:45 AM EDT  
LAB with [address]  
[address] [place] [address] Rudi Manleyon [address] [place] [address]  
[phone] Tuesday July 25, 2017  8:15 AM EDT Follow Up with Jane Saravia MD  
[facility] Yeager [address] [place] [id number]  
[phone] Current Discharge Medication List  
  
ASK your doctor about these medications Dose & Instructions Dispensing Information Comments Morning Noon Evening Bedtime  
 albuterol 90 mcg/actuation inhaler Commonly known as:  PROVENTIL HFA, VENTOLIN HFA, PROAIR HFA Your last dose was: Your next dose is:    
   
   
 Dose:  2 Puff Take 2 Puffs by inhalation every six (6) hours as needed for Wheezing. Please instruct on use and dispense with a spacer. Quantity:  1 Inhaler Refills:  0  
     
   
   
   
  
 amLODIPine 10 mg tablet Commonly known as:  Claire Noyack Your last dose was: Your next dose is:    
   
   
 Dose:  10 mg Take 1 Tab by mouth daily. Quantity:  30 Tab Refills:  0  
     
   
   
   
  
 benzonatate 200 mg capsule Commonly known as:  TESSALON Your last dose was: Your next dose is:    
   
   
 Dose:  200 mg Take 1 Cap by mouth three (3) times daily as needed for Cough. Quantity:  30 Cap Refills:  0 Discharge Instructions RESPIRATORY CARE - BRONCHOSCOPY - DISCHARGE INSTRUCTIONS You received a lot of numbing medication for your throat and nose, and you also received medication to make you sleepy during your procedure. Because of this and because the bronchoscopy may have irritated your airways, we ask that you follow these directions: 1. Do not eat or drink until  1300 . After that, you may have what you please. You may want to try some liquids first, because your throat may be a little sore. 2. Medication may cause drowsiness for several hours, therefore, do not drive or operate machinery for remainder of the day. No alcohol today. 3. You may cough up more mucus than usual and you may see some blood, but this is expected and should subside by the following day. 4. If severe throat irritation, coughing, or bleeding continue, call your doctor. 5.         If you run a fever greater than 102, call [facility] at [phone]. 6.         Dr. Neha Riley has asked that you: A. Call the doctor's office at [phone] for problems. B. See Dr Rohith Zaidi in the office July 25 as [facility] 
 
  
 
Any additional instructions:  Motrin or tylenol for fever. Instructions given to Alexander Teran and other family members Instructions given by:  Mary Golden, RT Discharge Orders None Introducing [facility]! Bernardo Hanna introduces [employer] patient portal. Now you can access parts of your medical record, email your doctor's office, and request medication refills online. 1. In your internet browser, go to [email or web address] 2. Click on the First Time User? Click Here link in the Sign In box. You will see the New Member Sign Up page. 3. Enter your [employer] Access Code exactly as it appears below. You will not need to use this code after youve completed the sign-up process. If you do not sign up before the expiration date, you must request a new code. · [employer] Access Code: [email or web address] Expires: 9/17/2017  2:35 PM 
 
4. Enter the last four digits of your Social Security Number (xxxx) and Date of Birth (mm/dd/yyyy) as indicated and click Submit. You will be taken to the next sign-up page. 5. Create a [employer] ID. This will be your [employer] login ID and cannot be changed, so think of one that is secure and easy to remember. 6. Create a [employer] password. You can change your password at any time. 7. Enter your Password Reset Question and Answer. This can be used at a later time if you forget your password. 8. Enter your e-mail address. You will receive e-mail notification when new information is available in [address] 9. Click Sign Up. You can now view and download portions of your medical record. 10. Click the Download Summary menu link to download a portable copy of your medical information. If you have questions, please visit the Frequently Asked Questions section of the [employer] website.  Remember, [employer] is NOT to be used for urgent needs. For medical emergencies, dial 911. Now available from your iPhone and Android! General Information Please provide this summary of care documentation to your next provider. Patient Signature:  ____________________________________________________________ Date:  ____________________________________________________________  
  
[place] Provider Signature:  ____________________________________________________________ Date:  ____________________________________________________________

## 2017-07-19 NOTE — DISCHARGE INSTRUCTIONS
RESPIRATORY CARE - BRONCHOSCOPY - DISCHARGE INSTRUCTIONS      You received a lot of numbing medication for your throat and nose, and you also received medication to make you sleepy during your procedure. Because of this and because the bronchoscopy may have irritated your airways, we ask that you follow these directions:    1. Do not eat or drink until  1300 . After that, you may have what you please. You may want to try some liquids first, because your throat may be a little sore. 2. Medication may cause drowsiness for several hours, therefore, do not drive or operate machinery for remainder of the day. No alcohol today. 3. You may cough up more mucus than usual and you may see some blood, but this is expected and should subside by the following day. 4. If severe throat irritation, coughing, or bleeding continue, call your doctor. 5.         If you run a fever greater than 102, call [facility] at [phone]. 6.         Dr. Jimmy Veliz has asked that you:                A. Call the doctor's office at [phone] for problems. B. See Dr Sandoval Sanches in the office July 25 as scheduled at [facility]         Any additional instructions:  Motrin or tylenol for fever.     Instructions given to Aleks Grow and other family members  Instructions given by:  Sherman Muro RT

## 2017-07-19 NOTE — H&P
HISTORY AND PHYSICAL      Alexander Teran    7/19/2017    Date of Admission:  7/19/2017    The patient's chart is reviewed and the patient is discussed with the staff. Subjective:     Patient is a 63 y.o.  female presents with cough and lung nodule with mediastinal LAD. She is seen as a new patient in the bronchoscopy lab. She was referred here by Dr Rohith Zaidi. She states she is an ongoing smoker, up to 2ppd for the past 40 years, now down to just an occasional cigarette. She states she developed L hip pain and cough. This prompted a CXR followed by a CT scan of this chest. This revealed a RUL lung nodule as well as extensive mediastinal LAD. PET scan showed activity in these areas as well as L iliac wing lesion and some uptake in the supraglottic region. She was first referred to heme/onc who referred her to us. She reports ongoing L hip pain and non-productive cough. Review of Systems  A comprehensive review of systems was negative except for that written in the HPI. Patient Active Problem List   Diagnosis Code    Mass of right lung R91.8    Lung nodule R91.1    Mediastinal adenopathy R59.0    Tobacco abuse Z72.0       Prior to Admission Medications   Prescriptions Last Dose Informant Patient Reported? Taking? albuterol (PROVENTIL HFA, VENTOLIN HFA, PROAIR HFA) 90 mcg/actuation inhaler 7/19/2017 at Unknown time  No Yes   Sig: Take 2 Puffs by inhalation every six (6) hours as needed for Wheezing. Please instruct on use and dispense with a spacer. amLODIPine (NORVASC) 10 mg tablet 7/19/2017 at Unknown time  No Yes   Sig: Take 1 Tab by mouth daily. benzonatate (TESSALON) 200 mg capsule Not Taking at Unknown time  No No   Sig: Take 1 Cap by mouth three (3) times daily as needed for Cough. Facility-Administered Medications: None       Past Medical History:   Diagnosis Date    Hypertension      No past surgical history on file.   Social History Social History    Marital status: LEGALLY      Spouse name: N/A    Number of children: N/A    Years of education: N/A     Occupational History    Not on file. Social History Main Topics    Smoking status: Current Every Day Smoker     Packs/day: 1.00     Years: 40.00    Smokeless tobacco: Never Used    Alcohol use No      Comment: stopped drinking 6/2017    Drug use: No    Sexual activity: Not on file     Other Topics Concern    Not on file     Social History Narrative     No family history on file. No Known Allergies    Current Facility-Administered Medications   Medication Dose Route Frequency    lidocaine (XYLOCAINE) 4 % (40 mg/mL) topical solution   Topical PRN    lidocaine (XYLOCAINE) 2 % jelly   Mucous Membrane PRN    promethazine (PHENERGAN) with saline injection 12.5 mg  12.5 mg IntraVENous ONCE    sodium chloride (NS) flush 5-10 mL  5-10 mL IntraVENous Q8H    sodium chloride (NS) flush 5-10 mL  5-10 mL IntraVENous PRN    0.9% sodium chloride infusion  25 mL/hr IntraVENous CONTINUOUS    midazolam (VERSED) injection 0.25-5 mg  0.25-5 mg IntraVENous Multiple    fentaNYL citrate (PF) injection 50 mcg  50 mcg IntraVENous Multiple           Objective:     Vitals:    07/19/17 0939 07/19/17 1016 07/19/17 1025 07/19/17 1029   BP:  164/87 (!) 150/92 145/87   Pulse:  84 82 78   Temp: 98.1 °F (36.7 °C)      SpO2:  97% 99% 99%   Weight: 160 lb (72.6 kg)      Height: 5' 7\" (1.702 m)          PHYSICAL EXAM     Constitutional:  the patient is well developed and in no acute distress  EENMT:  Sclera clear, pupils equal, oral mucosa moist  Respiratory: CTAB, no w/r/r  Cardiovascular:  RRR without M,G,R  Gastrointestinal: soft and non-tender; with positive bowel sounds. Musculoskeletal: warm without cyanosis. There is no lower leg edema.   Skin:  no jaundice or rashes, no wounds   Neurologic: no gross neuro deficits     Psychiatric:  alert and oriented x ppt    PET 7/13/17:        No results for input(s): WBC, HGB, HCT, PLT, INR, HGBEXT, HCTEXT, PLTEXT in the last 72 hours. No lab exists for component: INREXT  No results for input(s): NA, K, CL, GLU, CO2, BUN, CREA, MG, PHOS, CA, TROIQ, ALB, TBIL, TBILI, GPT, ALT, SGOT, BNPP in the last 72 hours. No lab exists for component: TROIP  No results for input(s): PH, PCO2, PO2, HCO3 in the last 72 hours. No results for input(s): LCAD, LAC in the last 72 hours. Assessment:  (Medical Decision Making)     Hospital Problems  Date Reviewed: 6/26/2017          Codes Class Noted POA    * (Principal)Lung nodule ICD-10-CM: R91.1  ICD-9-CM: 793.11  7/19/2017 Unknown        Mediastinal adenopathy ICD-10-CM: R59.0  ICD-9-CM: 785.6  7/19/2017 Unknown        Tobacco abuse ICD-10-CM: Z72.0  ICD-9-CM: 305.1  7/19/2017 Unknown            Patient with almost certainly a primary lung cancer. With lesions in the brain and iliac wing, appears to already have distant mets. Question is between small cell or non-small cell. Radiographic appearance favors small cell. Plan:  (Medical Decision Making)     --Will proceed with bronchoscopy with EBUS to obtain material for diagnosis    More than 50% of the time documented was spent in face-to-face contact with the patient and in the care of the patient on the floor/unit where the patient is located.     Ami Stevens MD

## 2017-07-19 NOTE — ROUTINE PROCESS
Hycodan prescription given to patient's sister and instructions on administration given to both patient and sister. Instructed patient take only as instructed and not to drive when taking. Lavinia Madsen RN notified of patient complaints of pain 8/10 at PET scan enhanced point on L hip. Dr Yamile Severino recommends Rad Onc consult concerning this. Cinthya Bowling will discuss this with Dr Jake Mcclure. Kapil Leos making appointment for patient to be see by Dr Carlos marie in 2-4 weeks. Coni Matson was given patient's phone number and will call her with this appointment.

## 2017-07-19 NOTE — ROUTINE PROCESS
Pt. Discharged to car by Debi Rock with family . Vital signs stable. Able to tolerate PO fluids. Passing gas.  Seen by MD.

## 2017-07-19 NOTE — PROCEDURES
PROCEDURE  Bronchoscopy with endobronchial ultrasound guided fine needle aspiration of mediastinal lymph nodes and airway inspection. INDICATION   Diagnosis of Mediastinal Lymphadenopathy/Staging of Lung Cancer    IMAGING  PET 7/13/17        POST OP DIAGNOSIS:  Station 4R was biopsied and was Positive for malignancy on BORA. Based on imaging and EBUS pt is a STAGE IV NSC or extensive stage SC lung cancer    ANESTHESIA  Concious sedation with: Fentanyl 350 mcg IV; Versed 5 mg IV; Lidocaine 240 mg to tracheo-bronchial tree and vocal cords; Phenergan 6.25 mg IV for nausea and vomiting prophylaxis, Solumedrol 60mg IV for possible vocal cord edema following procedure. AIRWAY INSPECTION  After obtaining informed consent, using a bite block, an Olympus Q 180 video bronchoscope was  introduced into the trachea through the vocal cords without complications. RIGHT  LOCATION NORM/ABNORMAL DESCRIPTION   VOCAL CORDS NL    TRACHEA NL    HUAN NL    RMSB NL    RUL ABNL Erythematous mucosa, external compression of RUL. BI NL    RML NL    RLL NL    SUP SEGM RLL NL    MED BASAL NL    ANTERIOR BASAL NL    LATERAL BASAL NL    POSTERIOR BASAL NL                    LEFT  LOCATION NORM/ABNORMAL DESCRIPTION   LMSB NL    SRINATH NL    LINGULA NL    LLL NL    SUPERIOR SEG LLL NL    SHIRA-MEDIAL LLL NL    LATERAL LLL NL    POSTERIOR LLL NL              EBUS  After completing the airway inspection an Olympus  F EBUS bronchoscope was introduced into the trachea through the vocal chords without complication.   The balloon was inflated with saline and a mediastinal inspection commenced:      STATION SIZE IN CM   4R 3cm   2R No target   7 5mm   2L No target   4L 3mm   10L No target   11L 3mm         After identifying targets the following samples were obtained:    STATION PASS# LYMPHOCYTES ATYPIA GRANULOMA DIAGNOSIS   4R 1 + + - -    2 + + - -    3 + + - Malignant    4 Cell block       5 Cell block       6 Cell block       7 Cell block       8 Cell block       9 Cell block             The procedure was completed without complication and the patient tolerated the procedure well. EBL: <30cc    Diagnosis: Primary lung cancer, small cell vs non-small cell. Complications: The patient was quite difficult to sedate, coughing nearly the entire procedure. Afterward experienced either upper airway obstruction or stridor. Given IV solumedrol for possible vocal cord swelling. Plan:  Patient's results will be sent to Dr Yasemin Mahoney for review. Will monitor her airway in recovery to determine if she is able to go home or needs to stay on obs status for monitoring.     Duran Greer MD

## 2017-07-19 NOTE — PROGRESS NOTES
I have reviewed the patients controlled substance prescription history, as maintained in the [place] prescription monitoring program, so that the prescription(s) for a  controlled substance can be given. There were no records of any controlled substances found for this patient. She has requested prescription cough medicine. A 2 week course of hycodan will be provided.      Jose Maria Vizcaino MD

## 2017-07-19 NOTE — IP AVS SNAPSHOT
Summary of Care Report The Summary of Care report has been created to help improve care coordination. Users with access to [employer] or "[employer] (Web-based application) may access additional patient information including the Discharge Summary. If you are not currently a [employer] user and need more information, please call the number listed below in the [place] [address] section and ask to be connected with Medical Records. Facility Information Name Address Phone [address] [address] [address] [phone] Patient Information Patient Name Sex GENOVEVA Wall ([id number]) Female 1955 Discharge Information Admitting Provider Service Area Unit Radha Mata MD / [address] Endoscopy / [phone] Discharge Provider Discharge Date/Time Discharge Disposition Destination (none) (none) (none) (none) Patient Language Language ENGLISH [13] Hospital Problems as of 2017  Reviewed: 2017  5:54 PM by Nicholas Lowry MD  
  
  
  
 Class Noted - Resolved Last Modified POA Active Problems * (Principal)Lung nodule  2017 - Present 2017 by Radha Mata MD Unknown Entered by Radha Mata MD  
  Mediastinal adenopathy  2017 - Present 2017 by Radha Mata MD Unknown Entered by Radha Mata MD  
  Tobacco abuse  2017 - Present 2017 by Radha Mata MD Unknown Entered by Radha Mata MD  
  Malignant neoplasm of overlapping sites of right lung [facility])  2017 - Present 2017 by Radha Mata MD Unknown Entered by Radha Mata MD  
  
Non-Hospital Problems as of 2017  Reviewed: 2017  5:54 PM by Nicholas Lowry MD  
  
  
  
 Class Noted - Resolved Last Modified Active Problems Mass of right lung  6/26/2017 - Present 6/26/2017 by Corrine Garrido MD  
  Entered by Corrine Garrido MD  
  
You are allergic to the following No active allergies Current Discharge Medication List  
  
ASK your doctor about these medications Dose & Instructions Dispensing Information Comments  
 albuterol 90 mcg/actuation inhaler Commonly known as:  PROVENTIL HFA, VENTOLIN HFA, PROAIR HFA Dose:  2 Puff Take 2 Puffs by inhalation every six (6) hours as needed for Wheezing. Please instruct on use and dispense with a spacer. Quantity:  1 Inhaler Refills:  0  
   
 amLODIPine 10 mg tablet Commonly known as:  Khadijah Kristen Dose:  10 mg Take 1 Tab by mouth daily. Quantity:  30 Tab Refills:  0  
   
 benzonatate 200 mg capsule Commonly known as:  TESSALON Dose:  200 mg Take 1 Cap by mouth three (3) times daily as needed for Cough. Quantity:  30 Cap Refills:  0 Surgery Information ID Date/Time Status Primary Surgeon All Procedures Location [id number] 7/19/2017 [address] [place] [address] ENDOSCOPIC BRONCHOSCOPY ULTRASOUND (EBUS) BRONCHOSCOPY 
FINE NEEDLE ASPIRATION [facility]    
 ENDOSCOPIC BRONCHOSCOPY ULTRASOUND (EBUS):  [id number] Follow-up Information None Discharge Instructions RESPIRATORY CARE - BRONCHOSCOPY - DISCHARGE INSTRUCTIONS You received a lot of numbing medication for your throat and nose, and you also received medication to make you sleepy during your procedure. Because of this and because the bronchoscopy may have irritated your airways, we ask that you follow these directions: 1. Do not eat or drink until  1300 . After that, you may have what you please. You may want to try some liquids first, because your throat may be a little sore.  
 
2. Medication may cause drowsiness for several hours, therefore, do not drive or operate machinery for remainder of the day. No alcohol today. 3. You may cough up more mucus than usual and you may see some blood, but this is expected and should subside by the following day. 4. If severe throat irritation, coughing, or bleeding continue, call your doctor. 5.         If you run a fever greater than 102, call [facility] at [phone]. 6.         Dr. Lashae Hollis has asked that you: A. Call the doctor's office at [phone] for problems. B. See Dr Yasemin Mahoney in the office July 25 as [facility]  
 
  
 
Any additional instructions:  Motrin or tylenol for fever. Instructions given to Ashley Plummer and other family members Instructions given by:  RT Hilario Chart Review Routing History Recipient Method Report Sent By Jane Gonsalves MD  
Phone: [phone] In Basket Notes Report Duran Greer MD [[id number]] 7/19/2017 11:27 AM 7/19/2017

## 2017-07-25 NOTE — TELEPHONE ENCOUNTER
SW received referral from RN [address] to assist pt with possible resources for Rx assistance. SW attempted to follow up with pt and left voicemail requesting phone call back. SW intends to follow up. Pt returned SW call and verbalized agreement to [facility] referral. SW completed referral and faxed it to [phone]. No other needs. SW arranged to follow up with pt at her radiation consult on 8/3 and pt verbalized appreciation.

## 2017-08-01 NOTE — PROGRESS NOTES
Interventional Radiology Pre Assessment Phone Call    Pt scheduled for port placement in IR on 8/2 at 930. Pt to arrive at 830 and check in at [facility] YAMILE Nogueira Check in at the 2nd floor radiology desk. Plan for Sedation:moderate sedation    Orders: Imaging tab  Labs: cbc &bmp 7/25   H&P: 7/25 c.c. Last IR Visit: n/a  NPO: after mn   including gum, mints and ice chips. Instructed patient to continue previous medications as prescribed prior to procedure except for herbal supplements and vitamins. Pt to take the following medications the day of the procedure with a small sip of water : norvasc and prns as needed   Instructed patient to hold  the following medications the day of the procedure: none  Patient instructed on the following and verbalized understanding:  Responsible adult must drive patient to and from hospital.  Patient verbalized understanding of all instructions and provided all medical/health information to the best of their ability.

## 2017-08-02 NOTE — IP AVS SNAPSHOT
Summary of Care Report The Summary of Care report has been created to help improve care coordination. Users with access to [email or web address] or [employer] [address] (Web-based application) may access additional patient information including the Discharge Summary. If you are not currently a [address] user and need more information, please call the number listed below in the [place] [address] section and ask to be connected with Medical Records. Facility Information Name Address Phone [address] [address] [address] [phone] Patient Information Patient Name Sex  Calvin Watkins ([id number]) Female 1955 Discharge Information Admitting Provider Service Area Unit  
 (none) [facility] / [phone] Discharge Provider Discharge Date/Time Discharge Disposition Destination (none) (none) (none) (none) Patient Language Language ENGLISH [13] Hospital Problems as of 2017  Reviewed: 2017  5:33 PM by Murali Mata MD  
 None Non-Hospital Problems as of 2017  Reviewed: 2017  5:33 PM by Murali Mata MD  
  
  
  
 Class Noted - Resolved Last Modified Active Problems Mass of right lung  2017 - Present 2017 by Murali Mata MD  
  Entered by Murali Mata MD  
  Lung nodule  2017 - Present 2017 by Chen Koenig MD  
  Entered by Chen Koenig MD  
  Mediastinal adenopathy  2017 - Present 2017 by Chen Koenig MD  
  Entered by Chen Koenig MD  
  Tobacco abuse  2017 - Present 2017 by Chen Koenig MD  
  Entered by Chen Koenig MD  
  Malignant neoplasm of overlapping sites of right lung [facility])  2017 - Present 2017 by Chen Koenig MD  
  Entered by Chen Koenig MD  
  
You are allergic to the following No active allergies Current Discharge Medication List  
  
ASK your doctor about these medications Dose & Instructions Dispensing Information Comments  
 albuterol 90 mcg/actuation inhaler Commonly known as:  PROVENTIL HFA, VENTOLIN HFA, PROAIR HFA Dose:  2 Puff Take 2 Puffs by inhalation every six (6) hours as needed for Wheezing. Please instruct on use and dispense with a spacer. Quantity:  1 Inhaler Refills:  1  
   
 amLODIPine 10 mg tablet Commonly known as:  Remonia Grates Dose:  10 mg Take 1 Tab by mouth daily. Quantity:  30 Tab Refills:  0  
   
 benzonatate 200 mg capsule Commonly known as:  TESSALON Dose:  200 mg Take 1 Cap by mouth three (3) times daily as needed for Cough. Quantity:  30 Cap Refills:  0 HYDROcodone-homatropine 5-1.5 mg/5 mL (5 mL) syrup Commonly known as:  HYCODAN Dose:  5 mL Take 5 mL by mouth four (4) times daily. Max Daily Amount: 20 mL. Quantity:  240 mL Refills:  0  
   
 traMADol 50 mg tablet Commonly known as:  ULTRAM  
 Dose:  50 mg Take 1 Tab by mouth every six (6) hours as needed for Pain. Max Daily Amount: 200 mg. Indications: NEUROPATHIC PAIN Quantity:  120 Tab Refills:  0 Follow-up Information None Discharge Instructions 1102 [facility] [address] Department of Interventional Radiology [facility] 
[phone] Office 
[phone] Fax Implanted [facility] Discharge Instructions General Instructions: 
 A port is like an implanted IV. They are usually ordered for patients who will be getting chemotherapy, but can also be used as an IV for long term antibiotics, large amounts of fluids, and/or blood products. Your blood can be drawn from your port for labs also.  Those patients who do not have good veins find the ports convenient as they can get the IV they need with one stick. The port can be used long term, and the care is easy. The device is under the skin, and once the skin heals, care is minimal. All that is required is the nurse who accesses the port will need to flush it with heparinized saline after each use. Ports are usually placed in the chest wall, usually on the right side. But they can be place in the arms and in the abdomen. Home Care Instructions: If your port is in your arm, do not allow blood pressure or other IVs to be place in that arm. Do not allow bra straps or any clothing to rub the skin over the port. Do not bathe or swim until the skin has healed and if the port is accessed. Once it is healed, and when the port is not accessed, it is okay to bathe and swim. Restrict yourself to light activity for the first 5 days after getting the port put in, after that, resume normal activity slowly. You may resume your normal diet and medications. Follow-Up Instructions: Please see your oncologist, or whatever physician ordered the port as he/she has requested of you. Call If: You should call your Physician and/or the Radiology Nurse if you notice redness, pus, swelling, or pain from the area of your incision. Call if you should develop a fever. The nurses who access your port will know to call your doctor if the port does not seem to be working properly. You need to tell the nurses who use the port if you should have any pain or swelling at the site during an infusion. Interventional Radiology General Nurse Discharge After general anesthesia or intravenous sedation, for 24 hours or while taking prescription Narcotics: · Limit your activities · Do not drive and operate hazardous machinery · Do not make important personal or business decisions · Do  not drink alcoholic beverages · If you have not urinated within 8 hours after discharge, please contact your surgeon on call. * Please give a list of your current medications to your Primary Care Provider. * Please update this list whenever your medications are discontinued, doses are 
   changed, or new medications (including over-the-counter products) are added. * Please carry medication information at all times in case of emergency situations. These are general instructions for a healthy lifestyle: No smoking/ No tobacco products/ Avoid exposure to second hand smoke Surgeon General's Warning:  Quitting smoking now greatly reduces serious risk to your health. Obesity, smoking, and sedentary lifestyle greatly increases your risk for illness A healthy diet, regular physical exercise & weight monitoring are important for maintaining a healthy lifestyle You may be retaining fluid if you have a history of heart failure or if you experience any of the following symptoms:  Weight gain of 3 pounds or more overnight or 5 pounds in a week, increased swelling in our hands or feet or shortness of breath while lying flat in bed. Please call your doctor as soon as you notice any of these symptoms; do not wait until your next office visit. Recognize signs and symptoms of STROKE: 
F-face looks uneven A-arms unable to move or move unevenly S-speech slurred or non-existent T-time-call 911 as soon as signs and symptoms begin-DO NOT go Back to bed or wait to see if you get better-TIME IS BRAIN. To Reach Us: If you have any questions about your procedure, please call the Interventional Radiology department at [phone]. After business hours (5pm) and weekends, call the answering service at [phone] and ask for the Radiologist on call to be paged. Si tiene Preguntas acerca del procedimiento, por favor llame al departamento de Radiología Intervencional al [phone].  Después de horas de oficina (5 pm) y los fines de [place], llamar al servicio de kira marie [phone] y pregunte por el Radiologo de [facility]. Patient Signature: 
Date: 8/2/2017 Discharging Nurse: Donald Davis RN Chart Review Routing History Recipient Method Report Sent By Mitch Wright MD  
Phone: [phone] In Basket Notes Report Yelitza Honeycutt MD [[id number]] 7/19/2017 11:27 AM 7/19/2017

## 2017-08-02 NOTE — IP AVS SNAPSHOT
[facility] 
 
 
 [address] [facility] [address] [facility] 
[phone] Patient: Brooke Aguirre MRN: [id number] FN You are allergic to the following No active allergies Recent Documentation Height Weight BMI OB Status Smoking Status 1.626 m 73.5 kg 27.81 kg/m2 Unknown Current Every Day Smoker Emergency Contacts Name Discharge Info Relation Home Work Mobile Kacie Keane  Daughter [21] [phone] About your hospitalization You were admitted on:  2017 You last received care in the:  [facility] You were discharged on:  2017 Unit phone number:  [phone] Why you were hospitalized Your primary diagnosis was:  Not on File Providers Seen During Your Hospitalizations Provider Role Specialty Primary office phone Rosaura Coleman MD Attending Provider Hematology [phone] Your Primary Care Physician (PCP) Primary Care Physician Office Phone Office Fax NONE ** None ** ** None ** Follow-up Information None Your Appointments   1:30 PM EDT  
[facility] CONSULT with Sri Sims MD  
[address] [place] [address]) [address] [facility] [address] [facility] [address]  
[phone] Tuesday August 15, 2017  8:05 AM EDT  
LAB with [address]  
[facility] OUTREACH INSURANCE [facility]) [address] Man[address]  
[phone] Tuesday August 15, 2017  8:30 AM EDT PreChemo Follow Up with Rosaura Coleman MD  
[facility] Hematology and Oncology [facility]) [place]/ Jose Luis Yeager [address] [facility] [address]  
[phone] Tuesday August 15, 2017  9:15 AM EDT Chemo with Huseyin Coker. [address] [place] [address] ([facility]) [address] Chris Faye [phone] [facility] [address]  
[phone] [address] Wednesday August 16, 2017 12:00 PM EDT  
IR FOLLOW UP with [facility]   
[facility] ([address] [place] [address]) [address] [facility] [address] [facility]  
[phone] Follow instructions as given by Radiologist or IR Staff. IR scheduling can be reached at [phone] Wednesday August 16, 2017  2:00 PM EDT Chemo with Simi Pleasure. [address] [facility] ([facility] [address] Sona Martino [phone] [facility] [address]  
[phone] [address] Thursday August 17, 2017  2:00 PM EDT Chemo with Simi Pleasure. [address] [facility] ([facility] [address] Sona Martino [phone] [facility] [address]  
[phone] [address] Current Discharge Medication List  
  
ASK your doctor about these medications Dose & Instructions Dispensing Information Comments Morning Noon Evening Bedtime  
 albuterol 90 mcg/actuation inhaler Commonly known as:  PROVENTIL HFA, VENTOLIN HFA, PROAIR HFA Your last dose was: Your next dose is:    
   
   
 Dose:  2 Puff Take 2 Puffs by inhalation every six (6) hours as needed for Wheezing. Please instruct on use and dispense with a spacer. Quantity:  1 Inhaler Refills:  1  
     
   
   
   
  
 amLODIPine 10 mg tablet Commonly known as:  Soledad Lanny Your last dose was: Your next dose is:    
   
   
 Dose:  10 mg Take 1 Tab by mouth daily. Quantity:  30 Tab Refills:  0  
     
   
   
   
  
 benzonatate 200 mg capsule Commonly known as:  TESSALON Your last dose was: Your next dose is:    
   
   
 Dose:  200 mg Take 1 Cap by mouth three (3) times daily as needed for Cough. Quantity:  30 Cap Refills:  0 HYDROcodone-homatropine 5-1.5 mg/5 mL (5 mL) syrup Commonly known as:  HYCODAN Your last dose was: Your next dose is:    
   
   
 Dose:  5 mL Take 5 mL by mouth four (4) times daily. Max Daily Amount: 20 mL. Quantity:  240 mL Refills:  0  
     
   
   
   
  
 traMADol 50 mg tablet Commonly known as:  ULTRAM  
   
Your last dose was: Your next dose is:    
   
   
 Dose:  50 mg Take 1 Tab by mouth every six (6) hours as needed for Pain. Max Daily Amount: 200 mg. Indications: NEUROPATHIC PAIN Quantity:  120 Tab Refills:  0 Discharge Instructions [address] Department of Interventional Radiology [place] [facility] 
[phone] Office 
[phone] Fax Implanted Ector Aly Discharge Instructions General Instructions: 
 A port is like an implanted IV. They are usually ordered for patients who will be getting chemotherapy, but can also be used as an IV for long term antibiotics, large amounts of fluids, and/or blood products. Your blood can be drawn from your port for labs also. Those patients who do not have good veins find the ports convenient as they can get the IV they need with one stick. The port can be used long term, and the care is easy. The device is under the skin, and once the skin heals, care is minimal. All that is required is the nurse who accesses the port will need to flush it with heparinized saline after each use. Ports are usually placed in the chest wall, usually on the right side. But they can be place in the arms and in the abdomen. Home Care Instructions: If your port is in your arm, do not allow blood pressure or other IVs to be place in that arm. Do not allow bra straps or any clothing to rub the skin over the port.  Do not bathe or swim until the skin has healed and if the port is accessed. Once it is healed, and when the port is not accessed, it is okay to bathe and swim. Restrict yourself to light activity for the first 5 days after getting the port put in, after that, resume normal activity slowly. You may resume your normal diet and medications. Follow-Up Instructions: Please see your oncologist, or whatever physician ordered the port as he/she has requested of you. Call If: You should call your Physician and/or the Radiology Nurse if you notice redness, pus, swelling, or pain from the area of your incision. Call if you should develop a fever. The nurses who access your port will know to call your doctor if the port does not seem to be working properly. You need to tell the nurses who use the port if you should have any pain or swelling at the site during an infusion. Interventional Radiology General Nurse Discharge After general anesthesia or intravenous sedation, for 24 hours or while taking prescription Narcotics: · Limit your activities · Do not drive and operate hazardous machinery · Do not make important personal or business decisions · Do  not drink alcoholic beverages · If you have not urinated within 8 hours after discharge, please contact your surgeon on call. * Please give a list of your current medications to your Primary Care Provider. * Please update this list whenever your medications are discontinued, doses are 
   changed, or new medications (including over-the-counter products) are added. * Please carry medication information at all times in case of emergency situations. These are general instructions for a healthy lifestyle: No smoking/ No tobacco products/ Avoid exposure to second hand smoke Surgeon General's Warning:  Quitting smoking now greatly reduces serious risk to your health. Obesity, smoking, and sedentary lifestyle greatly increases your risk for illness A healthy diet, regular physical exercise & weight monitoring are important for maintaining a healthy lifestyle You may be retaining fluid if you have a history of heart failure or if you experience any of the following symptoms:  Weight gain of 3 pounds or more overnight or 5 pounds in a week, increased swelling in our hands or feet or shortness of breath while lying flat in bed. Please call your doctor as soon as you notice any of these symptoms; do not wait until your next office visit. Recognize signs and symptoms of STROKE: 
F-face looks uneven A-arms unable to move or move unevenly S-speech slurred or non-existent T-time-call 911 as soon as signs and symptoms begin-DO NOT go Back to bed or wait to see if you get better-TIME IS BRAIN. To Reach Us: If you have any questions about your procedure, please call the Interventional Radiology department at [phone]. After business hours (5pm) and weekends, call the answering service at [phone] and ask for the Radiologist on call to be paged. Si tiene Preguntas acerca del procedimiento, por favor llame al departamento de Radiología Intervencional al [phone]. Después de horas de oficina (5 pm) y los fines de [place], llamar al Jian Cox de llamadas al [phone] y pregunte por el Radiologo de [place] [facility]. Patient Signature: 
Date: 8/2/2017 Discharging Nurse: Chuck Diaz RN Discharge Orders None Introducing [facility]! [facility] introduces [employer] patient portal. Now you can access parts of your medical record, email your doctor's office, and request medication refills online. 1. In your internet browser, go to [email or web address] 2. Click on the First Time User? Click Here link in the Sign In box. You will see the New Member Sign Up page. 3. Enter your [employer] Access Code exactly as it appears below.  You will not need to use this code after youve completed the sign-up process. If you do not sign up before the expiration date, you must request a new code. · [employer] Access Code: [email or web address] Expires: 9/17/2017  2:35 PM 
 
4. Enter the last four digits of your Social Security Number (xxxx) and Date of Birth (mm/dd/yyyy) as indicated and click Submit. You will be taken to the next sign-up page. 5. Create a [employer] ID. This will be your [employer] login ID and cannot be changed, so think of one that is secure and easy to remember. 6. Create a [employer] password. You can change your password at any time. 7. Enter your Password Reset Question and Answer. This can be used at a later time if you forget your password. 8. Enter your e-mail address. You will receive e-mail notification when new information is available in [address] 9. Click Sign Up. You can now view and download portions of your medical record. 10. Click the Download Summary menu link to download a portable copy of your medical information. If you have questions, please visit the Frequently Asked Questions section of the [employer] website. Remember, [employer] is NOT to be used for urgent needs. For medical emergencies, dial 911. Now available from your iPhone and Android! General Information Please provide this summary of care documentation to your next provider. Patient Signature:  ____________________________________________________________ Date:  ____________________________________________________________  
  
Larri Hazard Provider Signature:  ____________________________________________________________ Date:  ____________________________________________________________

## 2017-08-02 NOTE — PROCEDURES
Department of Interventional Radiology  [phone]        Interventional Radiology Brief Procedure Note    Patient: Ashley Plummer MRN: [id number]  SSN: [id number]    YOB: 1955  Age: 63 y.o. Sex: female      Date of Procedure: 8/2/2017    Pre-Procedure Diagnosis: Lung cancer    Post-Procedure Diagnosis: SAME    Procedure(s): Venous Chest Port Placement    Performed By: Buster Wyman MD     Assistants: None    Anesthesia:Moderate Sedation    Estimated Blood Loss: Less than 10ml    Specimens: None    Implants: Subcutaneous Port    Findings: Patent right internal jugular vein. Complications: None    Follow Up: sutures out 10 - 14 days.     Signed By: Buster Wyman MD     August 2, 2017

## 2017-08-02 NOTE — H&P
Department of Interventional Radiology  [phone]    History and Physical    Patient:  Gopi Preciado MRN:  [id number]  SSN:  [id number]    YOB: 1955  Age:  63 y.o. Sex:  female      Primary Care Provider:  None  Referring Physician:  Pallavi Lewis MD    Subjective:     Chief Complaint: port    History of the Present Illness: The patient is a 63 y.o. female who presents for chest port placement. RUL lung cancer with hilar adenopathy, brain and bone mets. C/o left hip pain, cough. NPO x meds. Past Medical History:   Diagnosis Date    Hypertension      History reviewed. No pertinent surgical history. Review of Systems:    Pertinent items are noted in the History of Present Illness. Current Outpatient Prescriptions   Medication Sig    albuterol (PROVENTIL HFA, VENTOLIN HFA, PROAIR HFA) 90 mcg/actuation inhaler Take 2 Puffs by inhalation every six (6) hours as needed for Wheezing. Please instruct on use and dispense with a spacer.  amLODIPine (NORVASC) 10 mg tablet Take 1 Tab by mouth daily.  traMADol (ULTRAM) 50 mg tablet Take 1 Tab by mouth every six (6) hours as needed for Pain. Max Daily Amount: 200 mg. Indications: NEUROPATHIC PAIN    HYDROcodone-homatropine (HYCODAN) 5-1.5 mg/5 mL (5 mL) syrup Take 5 mL by mouth four (4) times daily. Max Daily Amount: 20 mL.  benzonatate (TESSALON) 200 mg capsule Take 1 Cap by mouth three (3) times daily as needed for Cough. No current facility-administered medications for this encounter. No Known Allergies    History reviewed. No pertinent family history.   Social History   Substance Use Topics    Smoking status: Current Every Day Smoker     Packs/day: 1.00     Years: 40.00    Smokeless tobacco: Never Used    Alcohol use No      Comment: stopped drinking 6/2017        Objective:       Physical Examination:    Vitals:    08/01/17 1001 08/02/17 0909   BP:  141/87   Pulse:  71   Resp:  18   Temp: 97.8 °F (36.6 °C)   SpO2:  100%   Weight: 73.5 kg (162 lb) 73.5 kg (162 lb)   Height: 5' 4\" (1.626 m) 5' 4\" (1.626 m)     Blood pressure 141/87, pulse 71, temperature 97.8 °F (36.6 °C), resp. rate 18, height 5' 4\" (1.626 m), weight 73.5 kg (162 lb), SpO2 100 %.   HEART: regular rate and rhythm  LUNG: clear to auscultation bilaterally  ABDOMEN: normal findings: soft, non-tender  EXTREMITIES: normal strength, tone, and muscle mass, no deformities, no erythema, induration, or nodules    Laboratory:     Lab Results   Component Value Date/Time    Sodium 142 07/25/2017 08:17 AM    Sodium 144 06/19/2017 10:10 AM    Potassium 3.2 07/25/2017 08:17 AM    Potassium 4.6 06/19/2017 10:10 AM    Chloride 108 07/25/2017 08:17 AM    Chloride 106 06/19/2017 10:10 AM    CO2 29 07/25/2017 08:17 AM    CO2 28 06/19/2017 10:10 AM    Anion gap 5 07/25/2017 08:17 AM    Anion gap 10 06/19/2017 10:10 AM    Glucose 61 07/25/2017 08:17 AM    Glucose 92 06/19/2017 10:10 AM    BUN 14 07/25/2017 08:17 AM    BUN 13 06/19/2017 10:10 AM    Creatinine 0.99 07/25/2017 08:17 AM    Creatinine 1.20 06/19/2017 10:10 AM    GFR est AA >60 07/25/2017 08:17 AM    GFR est AA 59 06/19/2017 10:10 AM    GFR est non-AA >60 07/25/2017 08:17 AM    GFR est non-AA 49 06/19/2017 10:10 AM    Calcium 8.5 07/25/2017 08:17 AM    Calcium 8.7 06/19/2017 10:10 AM    Albumin 3.2 07/25/2017 08:17 AM    Albumin 3.5 06/19/2017 10:10 AM    Protein, total 6.9 07/25/2017 08:17 AM    Protein, total 8.1 06/19/2017 10:10 AM    Globulin 3.7 07/25/2017 08:17 AM    Globulin 4.6 06/19/2017 10:10 AM    A-G Ratio 0.9 07/25/2017 08:17 AM    A-G Ratio 0.8 06/19/2017 10:10 AM    AST (SGOT) 14 07/25/2017 08:17 AM    AST (SGOT) 30 06/19/2017 10:10 AM    ALT (SGPT) 12 07/25/2017 08:17 AM    ALT (SGPT) 17 06/19/2017 10:10 AM     Lab Results   Component Value Date/Time    WBC 5.7 07/25/2017 08:17 AM    WBC 6.9 06/19/2017 10:10 AM    HGB 11.6 07/25/2017 08:17 AM    HGB 13.9 06/19/2017 10:10 AM    HCT 35.3 07/25/2017 08:17 AM    HCT 41.8 06/19/2017 10:10 AM    PLATELET 511 67/28/2970 08:17 AM    PLATELET 309 67/64/0796 10:10 AM     No results found for: APTT, PTP, INR    Assessment:     Lung cancer    Plan:     Planned Procedure:  Port placement    Risks, benefits, and alternatives reviewed with patient and she agrees to proceed with the procedure.       Signed By: Janice Gaines PA-C     August 2, 2017

## 2017-08-02 NOTE — IP AVS SNAPSHOT
[address] 
[phone] Patient: Aleks Sethi MRN: [id number] Current Discharge Medication List  
  
ASK your doctor about these medications Dose & Instructions Dispensing Information Comments Morning Noon Evening Bedtime  
 albuterol 90 mcg/actuation inhaler Commonly known as:  PROVENTIL HFA, VENTOLIN HFA, PROAIR HFA Your last dose was: Your next dose is:    
   
   
 Dose:  2 Puff Take 2 Puffs by inhalation every six (6) hours as needed for Wheezing. Please instruct on use and dispense with a spacer. Quantity:  1 Inhaler Refills:  1  
     
   
   
   
  
 amLODIPine 10 mg tablet Commonly known as:  Erekelly Garry Your last dose was: Your next dose is:    
   
   
 Dose:  10 mg Take 1 Tab by mouth daily. Quantity:  30 Tab Refills:  0  
     
   
   
   
  
 benzonatate 200 mg capsule Commonly known as:  TESSALON Your last dose was: Your next dose is:    
   
   
 Dose:  200 mg Take 1 Cap by mouth three (3) times daily as needed for Cough. Quantity:  30 Cap Refills:  0 HYDROcodone-homatropine 5-1.5 mg/5 mL (5 mL) syrup Commonly known as:  HYCODAN Your last dose was: Your next dose is:    
   
   
 Dose:  5 mL Take 5 mL by mouth four (4) times daily. Max Daily Amount: 20 mL. Quantity:  240 mL Refills:  0  
     
   
   
   
  
 traMADol 50 mg tablet Commonly known as:  ULTRAM  
   
Your last dose was: Your next dose is:    
   
   
 Dose:  50 mg Take 1 Tab by mouth every six (6) hours as needed for Pain. Max Daily Amount: 200 mg. Indications: NEUROPATHIC PAIN Quantity:  120 Tab Refills:  0

## 2017-08-02 NOTE — PROGRESS NOTES
IR Nurse Pre-Procedure Checklist Part 2      Patient in IR suite     Consent signed: Yes    H&P complete:  Yes    Antibiotics: Yes    Airway/Mallampati Done: Yes    Shaved: Yes    Pregnancy Form:No    Patient Position: Yes    MD Side: Yes     Biopsy Worksheet: No    Specimen Medium: No

## 2017-08-02 NOTE — PROGRESS NOTES
Recovery period without difficulty. Pt alert and oriented and denies pain. Dressing is clean, dry, and intact. Reviewed discharge instructions with patient and daughter, both verbalized understanding. Pt escorted to lobby discharge area via wheelchair. Vital signs and Xiao score completed.

## 2017-08-03 NOTE — CONSULTS
Patient: Aileen Zamora MRN: [id number]  SSN: [id number]    YOB: 1955  Age: 63 y.o. Sex: female      Other Providers:  Guillermo Quiroz MD    CHIEF COMPLAINT: Lung cancer    DIAGNOSIS: Extensive Stage SCLC with brain metastasis, also with left hip pain. PREVIOUS TREATMENT:  1) None    HISTORY OF PRESENT ILLNESS:  Aileen Zamora is a 63 y.o. female who I am seeing at the request of Dr. Nae Bateman. She was a woman with limited previous medical follow-up although has a known diagnosis of HTN. She presented to the emergency department on 6/19/2017 with complaints of pain involving her left hip radiating down her left leg. She also complained of a cough which prompted a chest x-ray showed enlargement of the right hilum and a chest CT was suggested. CT showed a 3.1 x 4.1 right hilar soft tissue mass. There was narrowing of the adjacent bronchovascular structures. In the right upper lobe there was a 1.7 cm nodule and scattered subcentimeter mild groundglass opacities. The SVC was compressed by the mass but was patent. There was evident mediastinal adenopathy with a 2.5 x 1.9 cm precarinal lymph node. At that point, arrangements were made for her to be seen by oncology. Brain MRI showed an 18 mm fronto parietal brain lesion without much significant edema. In addition, her PET scan showed disease in the right lung and hilum as well as in the mediastinum. In addition, there was clear osseous metastasis involving the left iliac wing likely accounting for her pain. Endobronchial biopsy was consistent with a small cell lung carcinoma. Despite this, she has a fairly high functional status but has persistent pain only minimally responding to anti-inflammatories in the hip. Today in clinic she is quite uncomfortable to the point it is difficult for her to concentrate on the conversation.       PAST MEDICAL HISTORY:    Past Medical History:   Diagnosis Date    Hypertension        The patient denies history of collagen vascular diseases, pacemaker insertion, prior radiation or prior chemotherapy. PAST SURGICAL HISTORY:   No past surgical history on file. MEDICATIONS:     Current Outpatient Prescriptions:     albuterol (PROVENTIL HFA, VENTOLIN HFA, PROAIR HFA) 90 mcg/actuation inhaler, Take 2 Puffs by inhalation every six (6) hours as needed for Wheezing. Please instruct on use and dispense with a spacer., Disp: 1 Inhaler, Rfl: 1    amLODIPine (NORVASC) 10 mg tablet, Take 1 Tab by mouth daily. , Disp: 30 Tab, Rfl: 0    traMADol (ULTRAM) 50 mg tablet, Take 1 Tab by mouth every six (6) hours as needed for Pain. Max Daily Amount: 200 mg. Indications: NEUROPATHIC PAIN, Disp: 120 Tab, Rfl: 0    HYDROcodone-homatropine (HYCODAN) 5-1.5 mg/5 mL (5 mL) syrup, Take 5 mL by mouth four (4) times daily. Max Daily Amount: 20 mL., Disp: 240 mL, Rfl: 0    benzonatate (TESSALON) 200 mg capsule, Take 1 Cap by mouth three (3) times daily as needed for Cough. , Disp: 30 Cap, Rfl: 0  No current facility-administered medications for this encounter.      Facility-Administered Medications Ordered in Other Encounters:     0.9% sodium chloride infusion 500 mL, 500 mL, IntraVENous, CONTINUOUS, May Bond MD, Last Rate: 25 mL/hr at 08/02/17 0949, 500 mL at 08/02/17 0949    midazolam (VERSED) injection 0.5-2 mg, 0.5-2 mg, IntraVENous, Multiple, May Bond MD, 0.5 mg at 08/02/17 1032    fentaNYL citrate (PF) injection 25-50 mcg, 25-50 mcg, IntraVENous, Multiple, May Bond MD, 25 mcg at 08/02/17 1032    heparin (porcine) 100 unit/mL injection 300 Units, 300 Units, InterCATHeter, PRN, May Bond MD    HYDROcodone-acetaminophen (NORCO) 5-325 mg per tablet 1 Tab, 1 Tab, Oral, Q4H PRN, May Bond MD    ondansetron [facility]) injection 4 mg, 4 mg, IntraVENous, PRN, May Bond MD    ALLERGIES:   No Known Allergies    SOCIAL HISTORY:   Social History     Social History    Marital status: LEGALLY      Spouse name: N/A    Number of children: N/A    Years of education: N/A     Occupational History    Not on file. Social History Main Topics    Smoking status: Current Every Day Smoker     Packs/day: 1.00     Years: 40.00    Smokeless tobacco: Never Used    Alcohol use No      Comment: stopped drinking 6/2017    Drug use: No    Sexual activity: Not on file     Other Topics Concern    Not on file     Social History Narrative       FAMILY HISTORY:   No family history on file. REVIEW OF SYSTEMS: Please see the completed review of systems sheet in the chart that I have reviewed today. PHYSICAL EXAMINATION:   ECOG Performance status 2  VITAL SIGNS:   Visit Vitals    /83 (BP 1 Location: Left arm, BP Patient Position: Sitting)    Pulse 80    Temp 97.7 °F (36.5 °C)    Wt 72.1 kg (159 lb)    SpO2 98%    BMI 27.29 kg/m2        GENERAL: The patient is well-developed, ambulatory but with difficulty, alert and in no acute distress. HEENT: Head is normocephalic, atraumatic. Pupils are equal, round and reactive to light and accommodation. Extraocular movement intact. Hearing is intact bilaterally to finger rub. Oral cavity reveals no lesions. Mucous membranes are moist. NECK: Neck is supple with no masses. CARDIOVASCULAR: Heart is regular rate and rhythm. There are no murmurs rubs or gallups. Radial pulses are 2+ RESPIRATORY: Lungs are clear to auscultation and percussion. There is normal respiratory effort. GASTROINTESTINAL: The abdomen is soft, non-tender, nondistended with no hepatospelnomagaly. Digital rectal examination: deferred LYMPHATIC: There is no cervical, supraclavicular or axillary lymphadenopathy bilaterally. MUSCULOSKELETAL: Extremities reveal no cyanosis, clubbing or edema.  is 5+/5. BREASTS: Examination of the bilateral breasts reveals no dominant nodules or masses. NEURO:  Cranial nerves II-XII grossly intact.   Muscular strength and sensation are intact throughout all four extremities. PATHOLOGY:     DIAGNOSIS   4R Lymph Node, Fine Needle Aspiration:   MALIGNANT CELLS PRESENT. SEE COMMENT. Cell block: Rare malignant cells present. 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 COMMENT: Findings discussed with Dr. Sandoval Sanches on 7/25/17 at 8:50 a.m. LABORATORY:   Lab Results   Component Value Date/Time    Sodium 142 07/25/2017 08:17 AM    Potassium 3.2 07/25/2017 08:17 AM    Chloride 108 07/25/2017 08:17 AM    CO2 29 07/25/2017 08:17 AM    Anion gap 5 07/25/2017 08:17 AM    Glucose 61 07/25/2017 08:17 AM    BUN 14 07/25/2017 08:17 AM    Creatinine 0.99 07/25/2017 08:17 AM    GFR est AA >60 07/25/2017 08:17 AM    GFR est non-AA >60 07/25/2017 08:17 AM    Calcium 8.5 07/25/2017 08:17 AM    Albumin 3.2 07/25/2017 08:17 AM    Protein, total 6.9 07/25/2017 08:17 AM    Globulin 3.7 07/25/2017 08:17 AM    A-G Ratio 0.9 07/25/2017 08:17 AM    AST (SGOT) 14 07/25/2017 08:17 AM    ALT (SGPT) 12 07/25/2017 08:17 AM     Lab Results   Component Value Date/Time    WBC 5.7 07/25/2017 08:17 AM    HGB 11.6 07/25/2017 08:17 AM    HCT 35.3 07/25/2017 08:17 AM    PLATELET 317 36/65/9492 08:17 AM       RADIOLOGY:    I have personally reviewed the imaging and agree with the reports below. Xr Hip Lt W Or Wo Pelv 2-3 Vws    Result Date: 6/19/2017  Left hip Clinical indication: Mild-Moderate pain chronic without reported injury Comparison: none Technique: 3 views Findings: There is no evidence of fracture, dislocation, or erosion. Minimal-mild degenerative osteoarthrosis changes are noted of the pelvis and the bilateral hips. There is prominent stool in the visualized large bowel suggesting constipation. IMPRESSION: No acute osseous abnormality. Mri Brain W Wo Cont    Result Date: 7/10/2017  MRI of the brain INDICATION:  New diagnosis of lung cancer Standard MRI sequences were obtained through the brain in multiple planes.  Images were obtained before and after intravenous infusion of  15 mL MultiHance. FINDINGS: There is an enhancing 18 mm mass in the right frontoparietal cortex consistent with metastatic disease. No other mass lesions are seen. There are no other areas of abnormal enhancement. There is no evidence of acute hemorrhage or infarction. There is mild chronic change in the white matter. The ventricles are normal in size. There are no extra-axial fluid collections. There are no bony lesions. There is minimal debris in the right maxillary sinus. IMPRESSION: Single enhancing lesion in the right frontoparietal cortex, likely metastatic disease given history. Ct Chest W Cont    Result Date: 6/19/2017  CT Chest with contrast Clinical Indication:  Further evaluation of abnormal chest radiograph with enlarged right hilum, one month of moderate coughing and dyspnea Comparison:  Chest radiography today Technique:  Automated exposure Control was used. Contiguous axial images were obtained from the neck base through the upper abdomen following the uneventful administration of 80 mL of Isovue-370 intravenous contrast. Findings:  Corresponding to the mammogram is a lobulated soft tissue mass centered at the right hilum that measures approximately 3.1 x 4.1 cm (axial image 24). This causes mass effect and moderate to severe narrowing of adjacent bronchovascular structures. In the anterior right upper lobe there is an irregular 1.7 cm nodule (image 19). Elsewhere there are scattered subcentimeter mild groundglass opacities in the right upper lobe and the superior right lower lobe periphery, which are nonspecific and possibly pneumonitis. No pneumothorax, pleural effusion, or dense consolidation. SVC is patent although compressed by the mass and the mediastinal adenopathy. There is a suspicious 2.5 x 1.9 cm precarinal lymph node (image 20). The aorta and central pulmonary arteries are patent.  Limited upper abdominal views demonstrate no adrenal mass or acute abnormalities. Bone windows demonstrate no acute osseous abnormalities. Impression:  Right hilar mass, right lung nodule, mediastinal lymphadenopathy suspicious for malignancy. Pet/ct Tumor Image Skull Thigh W (ini)    Result Date: 7/14/2017  F-18 FDG PET/CT Imaging. Indication: staging of lung cancer, 63 years Female initial staging lung nodule seen on CT, persistent cough, had to void frequently prior to exam Comparison: CT chest June 19, 2017 Radiopharmaceutical: 15.9 mCi of F-18 FDG. Technique: Delayed PET post oral contrast CT images from skull base to mid thighs were performed. Patient received 10 cc of Gastroview for oral contrast. F-18 FDG PET/CT has limited sensitivity for low grade malignancies, small tumor deposits, mucin producing neoplasms. Findings: Symmetric intense FDG uptake is seen in the region of the supraglottic hypopharynx, nonspecific. Again visualized is conglomerate mediastinal and right hilar lymphadenopathy, associated with intense FDG uptake, maximum SUV of 11.4, consistent with local metastatic disease. There appears to be tumor encasement of the right upper lobe and right middle lobe bronchi. Relatively unchanged spiculated mass in the anterior right upper lobe measuring 1.8 x 2.0 cm, associated with intense FDG uptake, maximum SUV of 7.8, consistent with a primary lung adenocarcinoma. There are scattered groundglass nodules in the right upper lobe and right lower lobe associated with mild FDG uptake, maximum SUV of 1.9, which are nonspecific and may be infectious or inflammatory. Enlarged probable fibroid uterus without significant FDG uptake. A lytic mass is seen in the left iliac wing associated with intense FDG uptake, consistent with solitary osseous metastasis. Non diagnostic CT demonstrates grossly unremarkable liver, spleen, adrenals, kidneys, pancreas and bowel. Impression: 1.   Findings most likely represent a small primary bronchogenic carcinoma in the right upper lobe, with conglomerate mediastinal and right hilar janay metastatic disease. 2.  Patchy groundglass opacities in the right upper and lower lobes associated with mild FDG uptake, nonspecific and may be infectious or inflammatory. 3.  Solitary osseous metastasis in the left iliac wing. 4.  Intense FDG uptake in the supraglottic hypopharynx, nonspecific, however ENT consultation and direct visualization may be helpful. IMPRESSION:  Tena Davalos is a 63 y.o. feamle with SCLC. The natural history of brain metastasis was discussed with the patient. Prognostic factors including stage, performance status, and number of intracranial metastases were discussed. Various treatment options including whole brain radiation therapy, surgery, radiosurgery and supportive care alone were compared and contrasted with regard to outcome and quality of life. The indications, benefits, side effects, and complications of whole brain radiation therapy were reviewed. Fatigue, alopecia, headache, nausea, and vomiting as well as neurocognitive decline were among the complications highlighted. We discussed the shift in treatment from whole brain radiation to more focal treatments when possible. There is substantial trial data at this point to substantiate treatment of 1-4 brain metastases, barring lesions that are too large for stereotactic radiosurgery. However, this data is not applicable to small cell lung cancer in that the natural history suggests a higher rate of subclinical disease. Based on this histology, I have not recommended radiosurgery. We will therefore move forward with whole brain radiation with a plan to deliver 37.5 Gy in 15 fractions of 2.5 gray per fraction. Furthermore, I would agree with medical oncology that had radiotherapy to the left hip is logical considering the degree of pain she is having. I will plan to deliver a single treatment to this hip for that purpose. We will proceed with a treatment planning simulation tomorrow and coordinate her start with medical oncology. I hope to have this delivered on Monday next week. We are also assisting with her medications with social work assistance as she is not able to afford her medications. All of the patient's questions were answered to their satisfaction and written informed consent was obtained. PLAN:    1) Consented patient for treatment with external beam radiation after discussing risk, benefits, and side effects from treatment. 2) Reviewed available research treatment and cancer care protocols for which patient may be eligible. Unfortunately there are no matching clinical trials available at this time. 3) Coordinate care and start date with medical oncology. 4) CT Simulation planned for tomorrow at 9. Will plan for single fraction to the left hip and whole brain radiation for 15 fractions.       Sandra Hagan MD   August 3, 2017

## 2017-08-03 NOTE — PROGRESS NOTES
Pt here for consult for brain and bone mets. Presented in W/C alone. She states she does not know why she is seeing Dr. Neil Henderson today. States daughter was here but had to go back to work. She states she is out of pain medicine and cannot afford it. She states she is getting assistance. States pain to left hip area is level 7. F/u with Dr. Francisco Parada 8-15-17 to start chemo. MRI brain. Pet scan 7-13-17. CONSENTS SIGNED FOR RT TO THE WHOLE BRAIN AND LEFT HIP. CT [facility] SCHEDULED Friday, 8-4-17 AT 9 AM.  Dr. Neil Henderson will talk to pt's daughter when she picks her up today. We will notify Kenyetta Castellon when pt's daughter arrives.       Dennis Pandey RN

## 2017-08-04 NOTE — TELEPHONE ENCOUNTER
SW facilitated arranging transportation for pt's appt on 8/7 through patient relations. SW called pt's daughter and let her know of the time of transport. She verbalized understanding. No other needs at this time. SW intends to follow up.

## 2017-08-07 NOTE — PROGRESS NOTES
SW followed up with pt after her MD appointment in radiation oncology. Pt stated she will have daily radiation for the next three weeks but was unsure of her transportation. SW encouraged pt to speak to additional family members about transportation prior to SW scheduling transportation. Pt verbalized understanding and stated she would call her sister. SW provided SW contact information and encouraged pt to call SW should transportation be a barrier to her appointments. Pt verbalized understanding. SW intends to follow up.

## 2017-08-07 NOTE — PROGRESS NOTES
Patient: Rios Euceda MRN: [id number]  SSN: [id number]    YOB: 1955  Age: 63 y.o. Sex: female      DIAGNOSIS: Extensive Stage SCLC with brain metastasis, also with left hip pain.     PREVIOUS TREATMENT:  None    TREATMENT SITE:    1) Whole brain  2) Left hip    DOSE and FRACTIONATION:    1) 1/15 fractions, 250 cGy of 3750 cGy planned. 2) Single fraction 8 Gy completed 8/7/17    INTERVAL HISTORY:  Rios Euceda is a 63 y.o. female being treated for lung cancer. She was doing fair week 1, better than at day of consultation considering she has started pain medications. She completed her signal treatment today and will complete 15 total fractions to her whole brain. OBJECTIVE:  No findings week 1. There were no vitals taken for this visit. Lab Results   Component Value Date/Time    Sodium 142 07/25/2017 08:17 AM    Potassium 3.2 07/25/2017 08:17 AM    Chloride 108 07/25/2017 08:17 AM    CO2 29 07/25/2017 08:17 AM    Anion gap 5 07/25/2017 08:17 AM    Glucose 61 07/25/2017 08:17 AM    BUN 14 07/25/2017 08:17 AM    Creatinine 0.99 07/25/2017 08:17 AM    GFR est AA >60 07/25/2017 08:17 AM    GFR est non-AA >60 07/25/2017 08:17 AM    Calcium 8.5 07/25/2017 08:17 AM    Albumin 3.2 07/25/2017 08:17 AM    Protein, total 6.9 07/25/2017 08:17 AM    Globulin 3.7 07/25/2017 08:17 AM    A-G Ratio 0.9 07/25/2017 08:17 AM    AST (SGOT) 14 07/25/2017 08:17 AM    ALT (SGPT) 12 07/25/2017 08:17 AM     Lab Results   Component Value Date/Time    WBC 5.7 07/25/2017 08:17 AM    HGB 11.6 07/25/2017 08:17 AM    HCT 35.3 07/25/2017 08:17 AM    PLATELET 397 03/40/1812 08:17 AM       ASSESSMENT and PLAN:  Rios Euceda is tolerating radiation as anticipated for the current dose and fraction. We will continue on as planned with another treatment visit anticipated next week.         Jarold Goltz, MD   August 7, 2017

## 2017-08-14 NOTE — PROGRESS NOTES
I spoke with Vandana Bustillos regarding her having no insurance coverage. Ms. Ferdinand Rojo has applied for financial assistance through the hospital system. The foundation grants associated with her diagnosis is fully allocated. Next, I spoke with Ms. Ferdinand Rojo regarding potential oral medication authorizations. I told her that if she ever had any problems getting her oral medications filled to give the  dedicated [address] [place]  [place] a call. Most of the time, it is simply an authorization that needs to be done with the insurance company. Next, I spoke with Ms. Ferdinand Rojo regarding enrolling with [facility]. I went over some of the services that [facility] and [facility] offers and the enrollment process. Lastly, I gave Ms. Ferdinand Rojo a form with various resource organizations that could assist with specific needs (example:  transportation, lodging, preparing meals, home cleaning)                 Faxed Patient Referral form to the [address] [place], [place] [id number] sigifredo at [phone]. Phone [phone]. Form scanned into chart. Faxed Physician's Statement to the [id number] [place] [address] at [phone]. Phone [phone]. Form scanned into chart.

## 2017-08-14 NOTE — PROGRESS NOTES
Patient: Hermilo Villalta MRN: [id number]  SSN: [id number]    YOB: 1955  Age: 63 y.o. Sex: female      DIAGNOSIS: Extensive Stage SCLC with brain metastasis, also with left hip pain.     PREVIOUS TREATMENT:  None    TREATMENT SITE:    1) Whole brain  2) Left hip    DOSE and FRACTIONATION:    1) 6/15 fractions, 1500 cGy of 3750 cGy planned. 2) Single fraction 8 Gy completed 8/7/17    INTERVAL HISTORY:  Hermilo Villalta is a 63 y.o. female being treated for lung cancer. She was doing fair week 1, better than at day of consultation considering she has started pain medications. She completed her signal treatment  and will complete 15 total fractions to her whole brain. Nausea week 2. OBJECTIVE:  No findings week 1. There were no vitals taken for this visit. Lab Results   Component Value Date/Time    Sodium 142 07/25/2017 08:17 AM    Potassium 3.2 07/25/2017 08:17 AM    Chloride 108 07/25/2017 08:17 AM    CO2 29 07/25/2017 08:17 AM    Anion gap 5 07/25/2017 08:17 AM    Glucose 61 07/25/2017 08:17 AM    BUN 14 07/25/2017 08:17 AM    Creatinine 0.99 07/25/2017 08:17 AM    GFR est AA >60 07/25/2017 08:17 AM    GFR est non-AA >60 07/25/2017 08:17 AM    Calcium 8.5 07/25/2017 08:17 AM    Albumin 3.2 07/25/2017 08:17 AM    Protein, total 6.9 07/25/2017 08:17 AM    Globulin 3.7 07/25/2017 08:17 AM    A-G Ratio 0.9 07/25/2017 08:17 AM    AST (SGOT) 14 07/25/2017 08:17 AM    ALT (SGPT) 12 07/25/2017 08:17 AM     Lab Results   Component Value Date/Time    WBC 5.7 07/25/2017 08:17 AM    HGB 11.6 07/25/2017 08:17 AM    HCT 35.3 07/25/2017 08:17 AM    PLATELET 101 52/44/2742 08:17 AM       ASSESSMENT and PLAN:  Hermilo Villalta is tolerating radiation as anticipated for the current dose and fraction. Zofran for nausea. We will continue on as planned with another treatment visit anticipated next week.         Quan Plunkett MD   August 14, 2017

## 2017-08-21 NOTE — PROGRESS NOTES
Patient: Francie Lee MRN: [id number]  SSN: [id number]    YOB: 1955  Age: 63 y.o. Sex: female      DIAGNOSIS: Extensive Stage SCLC with brain metastasis, also with left hip pain.     PREVIOUS TREATMENT:  None    TREATMENT SITE:    1) Whole brain  2) Left hip    DOSE and FRACTIONATION:    1)  11/15 fractions, 2750cGy of 3750 cGy planned. 2) Single fraction 8 Gy completed 8/7/17    INTERVAL HISTORY:  Francie Lee is a 63 y.o. female being treated for lung cancer. She was doing fair week 1, better than at day of consultation considering she has started pain medications. She completed her signal treatment  and will complete 15 total fractions to her whole brain. Nausea week 2. No new issues week 3. OBJECTIVE:  No findings week 1. There were no vitals taken for this visit. Lab Results   Component Value Date/Time    Sodium 142 08/15/2017 08:19 AM    Potassium 3.9 08/15/2017 08:19 AM    Chloride 110 08/15/2017 08:19 AM    CO2 26 08/15/2017 08:19 AM    Anion gap 6 08/15/2017 08:19 AM    Glucose 92 08/15/2017 08:19 AM    BUN 13 08/15/2017 08:19 AM    Creatinine 0.82 08/15/2017 08:19 AM    GFR est AA >60 08/15/2017 08:19 AM    GFR est non-AA >60 08/15/2017 08:19 AM    Calcium 8.8 08/15/2017 08:19 AM    Albumin 3.6 08/15/2017 08:19 AM    Protein, total 7.3 08/15/2017 08:19 AM    Globulin 3.7 08/15/2017 08:19 AM    A-G Ratio 1.0 08/15/2017 08:19 AM    AST (SGOT) 20 08/15/2017 08:19 AM    ALT (SGPT) 17 08/15/2017 08:19 AM     Lab Results   Component Value Date/Time    WBC 4.1 08/15/2017 08:19 AM    HGB 11.9 08/15/2017 08:19 AM    HCT 35.8 08/15/2017 08:19 AM    PLATELET 266 65/12/6739 08:19 AM       ASSESSMENT and PLAN:  Francie Lee is tolerating radiation as anticipated for the current dose and fraction. Zofran for nausea. We will continue on as planned with with treatment to be completed friday. Will plan for 1 month follow up.        Phuong Canela MD August 21, 2017

## 2017-08-22 NOTE — PROGRESS NOTES
SW received referral from UVALDO Clark in radiation to assist pt with possible transportation needs. SW met with pt who stated her sister is able to drive her at a different time but she had to check with the sister first. SW then spoke with pt and her sister. Sister stated she is able to take pt at next scheduled radiation appointments as they are later in the day and after her own appointments. SW provided SW contact information and encouraged them to call should availability change. They verbalized understanding. SW intends to follow up.

## 2017-08-28 NOTE — PROGRESS NOTES
Patient: Doris Pollack MRN: [id number]  SSN: [id number]    YOB: 1955  Age: 63 y.o. Sex: female      DIAGNOSIS: Extensive Stage SCLC with brain metastasis, also with left hip pain.     PREVIOUS TREATMENT:  None    TREATMENT SITE:    1) Whole brain  2) Left hip    DOSE and FRACTIONATION:    1)  14/15 fractions, 3500 cGy of 3750 cGy planned. 2) Single fraction 8 Gy completed 8/7/17    INTERVAL HISTORY:  Doris Pollack is a 63 y.o. female being treated for lung cancer. She was doing fair week 1, better than at day of consultation considering she has started pain medications. She completed her signal treatment  and will complete 15 total fractions to her whole brain. Nausea week 2. No new issues week 3. OBJECTIVE:  No findings week 1. There were no vitals taken for this visit. Lab Results   Component Value Date/Time    Sodium 142 08/15/2017 08:19 AM    Potassium 3.9 08/15/2017 08:19 AM    Chloride 110 08/15/2017 08:19 AM    CO2 26 08/15/2017 08:19 AM    Anion gap 6 08/15/2017 08:19 AM    Glucose 92 08/15/2017 08:19 AM    BUN 13 08/15/2017 08:19 AM    Creatinine 0.82 08/15/2017 08:19 AM    GFR est AA >60 08/15/2017 08:19 AM    GFR est non-AA >60 08/15/2017 08:19 AM    Calcium 8.8 08/15/2017 08:19 AM    Albumin 3.6 08/15/2017 08:19 AM    Protein, total 7.3 08/15/2017 08:19 AM    Globulin 3.7 08/15/2017 08:19 AM    A-G Ratio 1.0 08/15/2017 08:19 AM    AST (SGOT) 20 08/15/2017 08:19 AM    ALT (SGPT) 17 08/15/2017 08:19 AM     Lab Results   Component Value Date/Time    WBC 4.1 08/15/2017 08:19 AM    HGB 11.9 08/15/2017 08:19 AM    HCT 35.8 08/15/2017 08:19 AM    PLATELET 758 00/95/8782 08:19 AM       ASSESSMENT and PLAN:  Doris Pollack is tolerating radiation as anticipated for the current dose and fraction. Zofran for nausea. We will continue on as planned with with treatment to be completed tomorrow. Will plan for 1 month follow up.        Miguel Angel Swanson MD August 28, 2017

## 2017-08-30 NOTE — PROGRESS NOTES
Arrived to the [facility]. Carboplatin and etoposide completed. Patient tolerated well. Any issues or concerns during appointment: none. Patient aware of next infusion appointment on 08/31  at 1500 . Discharged via wheelchair.

## 2017-08-30 NOTE — PROGRESS NOTES
Problem: Chemotherapy Treatment  Goal: *Chemotherapy regimen followed  Outcome: Progressing Towards Goal  Carboplatin and Etoposide Review plan of care Monitor for reaction

## 2017-08-31 NOTE — PROGRESS NOTES
Pt arrived via wheelchair today at 1505, to receive IV chemotherapy. Pt tolerated without difficulty. Patient discharged via ambulatory accompanied by self. Instructed to notify physician of any problems, questions or concerns. Allowed opportunity for patient/family to ask questions. Verbalized understanding. Next appointment is Sept 1 at  with [facility] 0150.

## 2017-09-01 NOTE — PROGRESS NOTES
Arrived to the [facility]. Etoposide completed. Patient tolerated well. Any issues or concerns during appointment: none. Patient aware of next infusion appointment on 9/26/17 at 11:15am.  Discharged via wheelchair.

## 2017-09-05 PROBLEM — R11.2 NAUSEA AND VOMITING IN ADULT: Status: ACTIVE | Noted: 2017-01-01

## 2017-09-05 PROBLEM — R63.8 DEHYDRATION SYMPTOMS: Status: ACTIVE | Noted: 2017-01-01

## 2017-09-05 NOTE — PROGRESS NOTES
Arrived to infusion as add-on due to persistent nausea and vomiting since recent chemo on Friday. Received 1L IVF, Zofran, Decadron and Ativan IV. Reported some symptom relief at end of session. Also received 20meq KCL IV. Tolerated all well. Discharged via wheelchair. Cab was called for transport home. Aware of next appointment 9/6/17 @ [phone].

## 2017-09-05 NOTE — TELEPHONE ENCOUNTER
SW called pt to follow up on transportation needs. Pt stated she felt poorly and has been vomiting all weekend. SW had pt speak with her RN [address] who facilitated scheduling of pt IV fluids and rescheduling NP appointment for today. SW coordinated with patient relations to arrange pt transportation for today. SW called pt back and stated she had transportation arranged for  today at 12:30 pm for 1:15 pm appointment. Pt verbalized understanding. SW intends to follow up PRN.

## 2017-09-05 NOTE — PROGRESS NOTES
LYNNETTE followed up with pt in infusion. NP completed assessment and prescribed additional medications for nausea. Pt requested SW assistance with coordinating with her daughter to obtain prescriptions. SW got prices of medications from [address] and called pt's daughter who stated she would  pt's medications on 9/5. LYNNETTE updated pt. No other needs identified. LYNNETTE intends to follow up PRN.

## 2017-09-25 NOTE — TELEPHONE ENCOUNTER
SW received referral from Tonya Osborn to assist pt with transportation. SW coordinated with Patient Relations to arrange  for pt's upcoming appointments, starting with  at pt's listed address at 8:45 am on 9/26. SW called pt and relayed this information. Pt verbalized understanding and appreciation. SW intends to follow up with pt.

## 2017-09-26 NOTE — PROGRESS NOTES
SW followed up with pt at NP appointment. Pt known to SW from previous visits. SW completed visit with pt, NP Azucena Babinski, palliative NP Ginny Rollins, and RN [employer] . Pt presented her Medicaid card and it has been scanned into file. SW provided pt with list of transportation  times for appointments this week. Pt requested Meals on Wheels referral. SW completed and faxed referral to [phone]. SW encouraged pt to access assistance for Ensure from [address] [place] [address] and pt verbalized understanding. Pt's medications sent to [facility] with total price of $6.80 and SW relayed this to pt and she stated she may be able to pick them up today. SW offered to schedule [employer] Shukri Siriona transportation for future appointments and pt verbalized agreement. No other needs at this time. SW intends to follow up.

## 2017-09-26 NOTE — PROGRESS NOTES
Saw patient with NP, Nikki Sanchez for prechemo visit. Pt is here for cycle 2 of Carbo/Etoposide. Reviewed anti nausea meds with patient and added Emend to today's cycle. Palliative Care and Social Work met with patient. Refilled pain medications and arranged Meals on Wheels. Pt states daughter Renée Richardson sure she eats. \" Instructed pt to call with questions. Navigation will continue to follow.

## 2017-09-26 NOTE — PROGRESS NOTES
Pt arrived ambulatory to [facility] with port previously accessed with dressing dry and intact and with good blood return. NS infusing. Pre meds given IV. Carboplatin infused over 1 hour.  16 infused over 1 hour. Port flushed and packed with heparin and de accessed. Pt aware of next appt on 9/27/17 at 1400. Pt discharged ambulatory.

## 2017-09-27 NOTE — PROGRESS NOTES
Patient: Yoshi Armenta MRN: [id number]  SSN: [id number]    YOB: 1955  Age: 63 y.o. Sex: female      Other Providers:  Zabrina Mckeon MD    CHIEF COMPLAINT: Lung cancer    DIAGNOSIS: Extensive Stage SCLC with brain metastasis, also with left hip pain. PREVIOUS TREATMENT:  TREATMENT SITE:    1) Whole brain  2) Left hip     DOSE and FRACTIONATION:    1) 15 fractions, 3750 cGy   2) Single fraction 8 Gy, completed 8/7/17      HISTORY OF PRESENT ILLNESS:  Yoshi Armenta is a 63 y.o. female initially seen by Dr. Julissa Schultz at the request of Dr. Vasiliy Claire. She was a woman with limited previous medical follow-up although has a known diagnosis of HTN. She presented to the emergency department on 6/19/2017 with complaints of pain involving her left hip radiating down her left leg. She also complained of a cough which prompted a chest x-ray showed enlargement of the right hilum and a chest CT was suggested. CT showed a 3.1 x 4.1 right hilar soft tissue mass. There was narrowing of the adjacent bronchovascular structures. In the right upper lobe there was a 1.7 cm nodule and scattered subcentimeter mild groundglass opacities. The SVC was compressed by the mass but was patent. There was evident mediastinal adenopathy with a 2.5 x 1.9 cm precarinal lymph node. At that point, arrangements were made for her to be seen by oncology. Brain MRI showed an 18 mm fronto parietal brain lesion without much significant edema. In addition, her PET scan showed disease in the right lung and hilum as well as in the mediastinum. In addition, there was clear osseous metastasis involving the left iliac wing likely accounting for her pain. Endobronchial biopsy was consistent with a small cell lung carcinoma. Despite this, she has a fairly high functional status but has persistent pain only minimally responding to anti-inflammatories in the hip.  During the consultation she is quite uncomfortable to the point it is difficult for her to concentrate on the conversation. INTERVAL HISTORY: Ms Kaz Fernandez returns today for follow up. She is 1 month after completing radiation of the left hip and whole brain. She is currently on chemotherapy of carboplatin (cycle 2 yesterday). Left hip pain resolved with radiation. She denies new pain. She denies headaches, visual changes or unsteady gait. She ambulates with a cane. She is currently on chemotherapy Trung Im), cycle 2 yesterday. She states she is eating pretty good. She has mild nausea after chemotherapy, controlled with zofran. She is scheduled for IVF today and tomorrow with medical oncology. PAST MEDICAL HISTORY:    Past Medical History:   Diagnosis Date    Cancer ([facility] [address]     small cell lung cancer    Hypertension        The patient denies history of collagen vascular diseases, pacemaker insertion, prior radiation or prior chemotherapy. PAST SURGICAL HISTORY:   Past Surgical History:   Procedure Laterality Date    HX VASCULAR ACCESS       MEDICATIONS:     Current Outpatient Prescriptions:     HYDROcodone-acetaminophen (NORCO) 5-325 mg per tablet, Take 1 Tab by mouth every six (6) hours as needed for Pain. Max Daily Amount: 4 Tabs., Disp: 90 Tab, Rfl: 0    LORazepam (ATIVAN) 1 mg tablet, Take 1 Tab by mouth every six (6) hours as needed for Anxiety. Max Daily Amount: 4 mg. Indications: CANCER CHEMOTHERAPY-INDUCED NAUSEA AND VOMITING, Disp: 90 Tab, Rfl: 0    HYDROcodone-homatropine (HYCODAN) 5-1.5 mg/5 mL (5 mL) syrup, Take 5 mL by mouth four (4) times daily. Max Daily Amount: 20 mL., Disp: 240 mL, Rfl: 0    prochlorperazine (COMPAZINE) 10 mg tablet, Take 1 Tab by mouth every six (6) hours as needed. , Disp: 90 Tab, Rfl: 1    lidocaine-prilocaine (EMLA) topical cream, Apply  to affected area as needed. Apply to port site 30 minutes to 1 hour prior to port access, Disp: 30 g, Rfl: 0    amLODIPine (NORVASC) 10 mg tablet, Take 1 Tab by mouth daily. , Disp: 30 Tab, Rfl: 2    ondansetron hcl (ZOFRAN) 8 mg tablet, Take 1 Tab by mouth every eight (8) hours as needed for Nausea., Disp: 60 Tab, Rfl: 2    albuterol (PROVENTIL HFA, VENTOLIN HFA, PROAIR HFA) 90 mcg/actuation inhaler, Take 2 Puffs by inhalation every six (6) hours as needed for Wheezing. Please instruct on use and dispense with a spacer., Disp: 1 Inhaler, Rfl: 1  No current facility-administered medications for this encounter. ALLERGIES:   No Known Allergies    SOCIAL HISTORY:   Social History     Social History    Marital status: LEGALLY      Spouse name: N/A    Number of children: N/A    Years of education: N/A     Occupational History    Not on file. Social History Main Topics    Smoking status: Current Every Day Smoker     Packs/day: 1.00     Years: 40.00    Smokeless tobacco: Never Used    Alcohol use No      Comment: stopped drinking 6/2017    Drug use: No    Sexual activity: Not on file     Other Topics Concern    Not on file     Social History Narrative       FAMILY HISTORY:   No family history on file. REVIEW OF SYSTEMS: Please see the completed review of systems sheet in the chart that I have reviewed today. PHYSICAL EXAMINATION:   ECOG Performance status 2  VITAL SIGNS: blood pressure 150/88  Pulse 91  Weight 147.2     GENERAL: The patient is well-developed, ambulatory but with difficulty, alert and in no acute distress. HEENT: Head is normocephalic, atraumatic. Pupils are equal, round and reactive to light and accommodation. Extraocular movement intact. Oral cavity reveals no lesions. Mucous membranes are dry. NECK: Neck is supple with no masses. CARDIOVASCULAR: Heart is regular rate and rhythm. RESPIRATORY: Lungs are clear to auscultation. There is normal respiratory effort. LYMPHATIC: There is no cervical, supraclavicular lymphadenopathy bilaterally. NEURO:  Cranial nerves II-XII grossly intact.   Muscular strength and sensation are intact throughout all four extremities. PATHOLOGY:    DIAGNOSIS   4R Lymph Node, Fine Needle Aspiration:   MALIGNANT CELLS PRESENT. SEE COMMENT. Cell block: Rare malignant cells present. 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 COMMENT: Findings discussed with Dr. Alex Bartholomew on 7/25/17 at 8:50 a.m. LABORATORY: None today    RADIOLOGY:    I have personally reviewed the imaging and agree with the reports below. Xr Hip Lt W Or Wo Pelv 2-3 Vws    Result Date: 6/19/2017  Left hip Clinical indication: Mild-Moderate pain chronic without reported injury Comparison: none Technique: 3 views Findings: There is no evidence of fracture, dislocation, or erosion. Minimal-mild degenerative osteoarthrosis changes are noted of the pelvis and the bilateral hips. There is prominent stool in the visualized large bowel suggesting constipation. IMPRESSION: No acute osseous abnormality. Mri Brain W Wo Cont    Result Date: 7/10/2017  MRI of the brain INDICATION:  New diagnosis of lung cancer Standard MRI sequences were obtained through the brain in multiple planes. Images were obtained before and after intravenous infusion of  15 mL MultiHance. FINDINGS: There is an enhancing 18 mm mass in the right frontoparietal cortex consistent with metastatic disease. No other mass lesions are seen. There are no other areas of abnormal enhancement. There is no evidence of acute hemorrhage or infarction. There is mild chronic change in the white matter. The ventricles are normal in size. There are no extra-axial fluid collections. There are no bony lesions. There is minimal debris in the right maxillary sinus. IMPRESSION: Single enhancing lesion in the right frontoparietal cortex, likely metastatic disease given history.      Ct Chest W Cont    Result Date: 6/19/2017  CT Chest with contrast Clinical Indication:  Further evaluation of abnormal chest radiograph with enlarged right hilum, one month of moderate coughing and dyspnea Comparison:  Chest radiography today Technique:  Automated exposure Control was used. Contiguous axial images were obtained from the neck base through the upper abdomen following the uneventful administration of 80 mL of Isovue-370 intravenous contrast. Findings:  Corresponding to the mammogram is a lobulated soft tissue mass centered at the right hilum that measures approximately 3.1 x 4.1 cm (axial image 24). This causes mass effect and moderate to severe narrowing of adjacent bronchovascular structures. In the anterior right upper lobe there is an irregular 1.7 cm nodule (image 19). Elsewhere there are scattered subcentimeter mild groundglass opacities in the right upper lobe and the superior right lower lobe periphery, which are nonspecific and possibly pneumonitis. No pneumothorax, pleural effusion, or dense consolidation. SVC is patent although compressed by the mass and the mediastinal adenopathy. There is a suspicious 2.5 x 1.9 cm precarinal lymph node (image 20). The aorta and central pulmonary arteries are patent. Limited upper abdominal views demonstrate no adrenal mass or acute abnormalities. Bone windows demonstrate no acute osseous abnormalities. Impression:  Right hilar mass, right lung nodule, mediastinal lymphadenopathy suspicious for malignancy. Pet/ct Tumor Image Skull Thigh W (ini)    Result Date: 7/14/2017  F-18 FDG PET/CT Imaging. Indication: staging of lung cancer, 63 years Female initial staging lung nodule seen on CT, persistent cough, had to void frequently prior to exam Comparison: CT chest June 19, 2017 Radiopharmaceutical: 15.9 mCi of F-18 FDG. Technique: Delayed PET post oral contrast CT images from skull base to mid thighs were performed. Patient received 10 cc of Gastroview for oral contrast. F-18 FDG PET/CT has limited sensitivity for low grade malignancies, small tumor deposits, mucin producing neoplasms.  Findings: Symmetric intense FDG uptake is seen in the region of the supraglottic hypopharynx, nonspecific. Again visualized is conglomerate mediastinal and right hilar lymphadenopathy, associated with intense FDG uptake, maximum SUV of 11.4, consistent with local metastatic disease. There appears to be tumor encasement of the right upper lobe and right middle lobe bronchi. Relatively unchanged spiculated mass in the anterior right upper lobe measuring 1.8 x 2.0 cm, associated with intense FDG uptake, maximum SUV of 7.8, consistent with a primary lung adenocarcinoma. There are scattered groundglass nodules in the right upper lobe and right lower lobe associated with mild FDG uptake, maximum SUV of 1.9, which are nonspecific and may be infectious or inflammatory. Enlarged probable fibroid uterus without significant FDG uptake. A lytic mass is seen in the left iliac wing associated with intense FDG uptake, consistent with solitary osseous metastasis. Non diagnostic CT demonstrates grossly unremarkable liver, spleen, adrenals, kidneys, pancreas and bowel. Impression: 1. Findings most likely represent a small primary bronchogenic carcinoma in the right upper lobe, with conglomerate mediastinal and right hilar janay metastatic disease. 2.  Patchy groundglass opacities in the right upper and lower lobes associated with mild FDG uptake, nonspecific and may be infectious or inflammatory. 3.  Solitary osseous metastasis in the left iliac wing. 4.  Intense FDG uptake in the supraglottic hypopharynx, nonspecific, however ENT consultation and direct visualization may be helpful. IMPRESSION:  Rakan Logan is a 63 y.o. feamle with SCLC with metastatic disease. She is now 1 month after completing radiation to the left hip and whole brain. She is recovering as anticipated from radiation therapy. Left hip pain resolved with radiation. She denies new pain. She denies headaches, visual changes or unsteady gait. She ambulates with a cane.  She is currently on chemotherapy Olivia Gutierrez, cycle 2 yesterday. PLAN:    1)  Brain MRI - 3 months. I will see her shortly there after  2)  I spent 25 minutes with Ms Thien Henry, in which >50% was spent on counseling regarding the management of her brain metastasis and bone lesion. Isabela Bhakta NP   September 27, 2017      Portions of this note were copied from prior encounters and reviewed for accuracy, currency, and represent documentation and tasks completed during this encounter. I verify and attest these portions to be unchanged from prior visits.

## 2017-09-28 NOTE — ROUTINE PROCESS
Arrived to the [facility]. Etoposide completed. Patient tolerated well  Any issues or concerns during appointment:No  Patient aware of next infusion appointment on Tuesday,October 24th @ [phone]  Discharged home

## 2017-09-29 NOTE — TELEPHONE ENCOUNTER
SW attempted to schedule pt's [employer]afammy [employer]n transportation for upcoming appointments but was told that pt's Medicaid has to be re-verified for October and therefore transportation appointments cannot be made at this time. SW updated pt and offered to try to call again on 10/2 and pt verbalized agreement. SW intends to assist in transportation scheduling and update pt.

## 2017-10-02 NOTE — TELEPHONE ENCOUNTER
LYNNETTE called Medicaid Emelia Chock transportation and scheduled pt's transportation for her October appointments. LYNNETTE then mailed pt schedule of transportation pick ups for October appointments. LYNNETTE called pt and left voicemail stating pt's transportation schedule and [employer] contact information would be mailed. LYNNETTE called pt's daughter who also requested SW mail transportation schedule to her address. LYNNETTE mailed copy of schedule to pt's daughter's home. No other needs at this time. LYNNETTE intends to remain available for assistance PRN.

## 2017-10-24 NOTE — PROGRESS NOTES
Arrived to the [facility]. Chemo completed. Patient tolerated well. Any issues or concerns during appointment: none. Patient aware of next infusion appointment on 10/25/17 at 1315. Discharged in [place] [address].

## 2017-10-26 NOTE — PROGRESS NOTES
Arrived to the [facility]. Etoposide completed. Patient tolerated well. Any issues or concerns during appointment: none. Patient aware of next infusion appointment on 11/14  at 1400. Discharged home.

## 2017-10-26 NOTE — PROGRESS NOTES
LYNNETTE received referral from [employer] to assist with scheduling pt transportation for rescheduled chemo. LYNNETTE coordinated with Patient Relations to assist pt with transportation. LYNNETTE then updated pt in infusion of her  time of 12 pm on 10/27. Pt verbalized understanding. SW intends to follow up PRN.

## 2017-10-27 NOTE — PROGRESS NOTES
Arrived to the [facility]. Etoposide completed. Patient tolerated well. Any issues or concerns during appointment: none. Patient aware of next infusion appointment on 11/14 (date) at 8:40 AM (time). Discharged via w/c.

## 2017-10-30 NOTE — PROGRESS NOTES
LYNNETTE received referral from [address] [place] [address] as pt arrived to [facility] but did not have an appointment today. LYNNETTE coordinated with transportation, Patient Relations, and pt to have pt returned home at pt's request. LYNNETTE offered to schedule Medicaid [employer] transportation for pt's November appointments and pt verbalized agreement. No other needs. LYNNETTE encouraged pt to call should any needs arise and pt verbalized understanding. LYNNETTE scheduled pt's transportation for upcoming appointments and mailed pt's schedule to both pt and her daughter, per pt request. LYNNETTE intends to follow up PRN.

## 2017-11-14 NOTE — PROGRESS NOTES
LYNNETTE received referral from [employer] to assist pt with transportation for scheduled appointments. Pt known to SW from previous appointments. SW met with pt who requested Medicaid van scheduling and stated she needed assistance with Rx copays. LYNNETTE encouraged pt to access Rx assistance at \[facility]\"". Pt verbalized understanding. LYNNETTE called and scheduled [employer] Charma Guadarrama  for pt's November and December appointments, see below. LYNNETTE mailed schedule and pamphlet of services at [facility] to pt's address listed. No other needs at this time. LYNNETTE intends to follow up PRN.

## 2017-12-04 NOTE — PROGRESS NOTES
I saw pt today with Dr Montana Ross. She received results of scans which were good. Pt will continue Carbo/-16 and have dos today. She is also having difficulty with appetite so Megace will be ordered and sent to our pharmacy. Pt will have weekly CBCs on Mondays at 11am. Pt verbalizes understanding of these instructions and agrees with plan.  Nurse navigation will be following

## 2017-12-05 NOTE — PROGRESS NOTES
Problem: Knowledge Deficit  Goal: *Verbalize description of procedure  Outcome: Progressing Towards Goal  Verbalizes/demonstrates understanding of purpose/procedure/potential side effects of carboplatin/etoposide.

## 2017-12-05 NOTE — PROGRESS NOTES
Pt arrived via wheelchair today at [phone], to receive IV chemotherapy. Pt tolerated without difficulty. Patient discharged via wheelchair accompanied by self. Instructed to notify physician of any problems, questions or concerns. Allowed opportunity for patient/family to ask questions. Verbalized understanding. Next appointment is Dec 6 at 1400 with Duke Braden.

## 2017-12-06 NOTE — PROGRESS NOTES
Arrived to the [facility]. Etoposide completed. Patient tolerated well. Any issues or concerns during appointment: none. Patient aware of next infusion appointment on 12/7 (date) at 2:00 PM (time). Discharged via w/c.

## 2017-12-07 NOTE — PROGRESS NOTES
Arrived to the [facility]. Etoposide completed. Patient tolerated well. Any issues or concerns during appointment: no.  Patient aware of next infusion appointment on 12/26 (date) at [phone] (time). Discharged via w/c.

## 2017-12-27 NOTE — TELEPHONE ENCOUNTER
SW received referral from Science to schedule pt's [employer] [facility] transportation for [employer]. SW scheduled transportation, see below. SW called pt and provided first  time, mailed pt's schedule, and assessed needs. Pt verbalized understanding and stated she was out of some medication but was unable to name the medication. SW encouraged pt to bring her medications with her to her appointment to verify which ones need refills. Pt verbalized understanding. No other needs. SW intends to follow up.     1/2/18 labs, appointment with Dr. Aime Adrian  at 8:01 am for 8:45 am appointment  Confirmation number: [id number]    1/3/18 chemo  [facility]  at 6:31 am for 7:15 am appointment  Confirmation number: [id number]    1/4/18 chemo, radiation follow up with Nurse Practitioner  [facility]  at 6:31 am for 7:15 am appointment  Confirmation number: [id number]    1/5/18 chemo  [facility]  at 6:31 am for 7:15 am appointment  Confirmation number: [id number]

## 2018-01-01 ENCOUNTER — HOME CARE VISIT (OUTPATIENT)
Dept: SCHEDULING | Facility: HOME HEALTH | Age: 63
End: 2018-01-01
Payer: MEDICAID

## 2018-01-01 ENCOUNTER — HOME CARE VISIT (OUTPATIENT)
Dept: HOSPICE | Facility: HOSPICE | Age: 63
End: 2018-01-01
Payer: MEDICAID

## 2018-01-01 ENCOUNTER — DOCUMENTATION ONLY (OUTPATIENT)
Dept: ONCOLOGY | Age: 63
End: 2018-01-01

## 2018-01-01 ENCOUNTER — TELEPHONE (OUTPATIENT)
Dept: ONCOLOGY | Age: 63
End: 2018-01-01

## 2018-01-01 ENCOUNTER — HOSPITAL ENCOUNTER (OUTPATIENT)
Dept: INFUSION THERAPY | Age: 63
End: 2018-01-01

## 2018-01-01 ENCOUNTER — HOSPITAL ENCOUNTER (OUTPATIENT)
Dept: LAB | Age: 63
Discharge: HOME | End: 2018-03-08
Payer: COMMERCIAL

## 2018-01-01 ENCOUNTER — HOSPITAL ENCOUNTER (OUTPATIENT)
Dept: RADIATION ONCOLOGY | Age: 63
End: 2018-01-01

## 2018-01-01 ENCOUNTER — HOSPITAL ENCOUNTER (OUTPATIENT)
Dept: CT IMAGING | Age: 63
Discharge: HOME | End: 2018-03-01
Attending: INTERNAL MEDICINE
Payer: COMMERCIAL

## 2018-01-01 ENCOUNTER — HOSPICE ADMISSION (OUTPATIENT)
Dept: HOSPICE | Facility: HOSPICE | Age: 63
End: 2018-01-01
Payer: MEDICAID

## 2018-01-01 ENCOUNTER — HOSPITAL ENCOUNTER (OUTPATIENT)
Dept: MRI IMAGING | Age: 63
Discharge: HOME | End: 2018-03-01
Attending: INTERNAL MEDICINE
Payer: COMMERCIAL

## 2018-01-01 ENCOUNTER — HOSPITAL ENCOUNTER (OUTPATIENT)
Dept: LAB | Age: 63
Discharge: HOME | End: 2018-01-02
Payer: COMMERCIAL

## 2018-01-01 ENCOUNTER — HOSPITAL ENCOUNTER (OUTPATIENT)
Dept: LAB | Age: 63
Discharge: HOME | End: 2018-02-06
Payer: COMMERCIAL

## 2018-01-01 ENCOUNTER — HOSPITAL ENCOUNTER (OUTPATIENT)
Dept: LAB | Age: 63
Discharge: HOME | End: 2018-04-04
Payer: COMMERCIAL

## 2018-01-01 VITALS — RESPIRATION RATE: 24 BRPM | SYSTOLIC BLOOD PRESSURE: 124 MMHG | HEART RATE: 92 BPM | DIASTOLIC BLOOD PRESSURE: 70 MMHG

## 2018-01-01 VITALS — DIASTOLIC BLOOD PRESSURE: 68 MMHG | SYSTOLIC BLOOD PRESSURE: 111 MMHG | HEART RATE: 68 BPM | RESPIRATION RATE: 17 BRPM

## 2018-01-01 VITALS — SYSTOLIC BLOOD PRESSURE: 118 MMHG | DIASTOLIC BLOOD PRESSURE: 70 MMHG | RESPIRATION RATE: 24 BRPM | HEART RATE: 88 BPM

## 2018-01-01 VITALS — RESPIRATION RATE: 20 BRPM | DIASTOLIC BLOOD PRESSURE: 70 MMHG | HEART RATE: 104 BPM | SYSTOLIC BLOOD PRESSURE: 124 MMHG

## 2018-01-01 DIAGNOSIS — C34.81 MALIGNANT NEOPLASM OF OVERLAPPING SITES OF RIGHT LUNG (HCC): ICD-10-CM

## 2018-01-01 LAB
ALBUMIN SERPL-MCNC: 3.2 G/DL (ref 3.2–4.6)
ALBUMIN SERPL-MCNC: 3.2 G/DL (ref 3.2–4.6)
ALBUMIN SERPL-MCNC: 3.6 G/DL (ref 3.2–4.6)
ALBUMIN SERPL-MCNC: 3.7 G/DL (ref 3.2–4.6)
ALBUMIN/GLOB SERPL: 0.8 {RATIO} (ref 1.2–3.5)
ALBUMIN/GLOB SERPL: 0.9 {RATIO} (ref 1.2–3.5)
ALP SERPL-CCNC: 42 U/L (ref 50–136)
ALP SERPL-CCNC: 42 U/L (ref 50–136)
ALP SERPL-CCNC: 49 U/L (ref 50–136)
ALP SERPL-CCNC: 55 U/L (ref 50–136)
ALT SERPL-CCNC: 11 U/L (ref 12–65)
ALT SERPL-CCNC: 12 U/L (ref 12–65)
ALT SERPL-CCNC: 13 U/L (ref 12–65)
ALT SERPL-CCNC: 14 U/L (ref 12–65)
ANION GAP SERPL CALC-SCNC: 6 MMOL/L (ref 7–16)
ANION GAP SERPL CALC-SCNC: 6 MMOL/L (ref 7–16)
ANION GAP SERPL CALC-SCNC: 8 MMOL/L (ref 7–16)
ANION GAP SERPL CALC-SCNC: 8 MMOL/L (ref 7–16)
AST SERPL-CCNC: 14 U/L (ref 15–37)
AST SERPL-CCNC: 15 U/L (ref 15–37)
AST SERPL-CCNC: 18 U/L (ref 15–37)
AST SERPL-CCNC: 19 U/L (ref 15–37)
BASOPHILS # BLD: 0 K/UL (ref 0–0.2)
BASOPHILS NFR BLD: 0 % (ref 0–2)
BILIRUB SERPL-MCNC: 0.4 MG/DL (ref 0.2–1.1)
BILIRUB SERPL-MCNC: 0.6 MG/DL (ref 0.2–1.1)
BUN SERPL-MCNC: 11 MG/DL (ref 8–23)
BUN SERPL-MCNC: 13 MG/DL (ref 8–23)
BUN SERPL-MCNC: 15 MG/DL (ref 8–23)
BUN SERPL-MCNC: 7 MG/DL (ref 8–23)
CALCIUM SERPL-MCNC: 9 MG/DL (ref 8.3–10.4)
CALCIUM SERPL-MCNC: 9 MG/DL (ref 8.3–10.4)
CALCIUM SERPL-MCNC: 9.4 MG/DL (ref 8.3–10.4)
CALCIUM SERPL-MCNC: 9.5 MG/DL (ref 8.3–10.4)
CHLORIDE SERPL-SCNC: 105 MMOL/L (ref 98–107)
CHLORIDE SERPL-SCNC: 105 MMOL/L (ref 98–107)
CHLORIDE SERPL-SCNC: 108 MMOL/L (ref 98–107)
CHLORIDE SERPL-SCNC: 110 MMOL/L (ref 98–107)
CO2 SERPL-SCNC: 25 MMOL/L (ref 21–32)
CO2 SERPL-SCNC: 26 MMOL/L (ref 21–32)
CO2 SERPL-SCNC: 28 MMOL/L (ref 21–32)
CO2 SERPL-SCNC: 29 MMOL/L (ref 21–32)
CREAT SERPL-MCNC: 0.59 MG/DL (ref 0.6–1)
CREAT SERPL-MCNC: 0.79 MG/DL (ref 0.6–1)
CREAT SERPL-MCNC: 0.87 MG/DL (ref 0.6–1)
CREAT SERPL-MCNC: 0.97 MG/DL (ref 0.6–1)
DIFFERENTIAL METHOD BLD: ABNORMAL
EOSINOPHIL # BLD: 0 K/UL (ref 0–0.8)
EOSINOPHIL NFR BLD: 0 % (ref 0.5–7.8)
EOSINOPHIL NFR BLD: 0 % (ref 0.5–7.8)
EOSINOPHIL NFR BLD: 1 % (ref 0.5–7.8)
EOSINOPHIL NFR BLD: 1 % (ref 0.5–7.8)
ERYTHROCYTE [DISTWIDTH] IN BLOOD BY AUTOMATED COUNT: 16.5 % (ref 11.9–14.6)
ERYTHROCYTE [DISTWIDTH] IN BLOOD BY AUTOMATED COUNT: 16.6 % (ref 11.9–14.6)
ERYTHROCYTE [DISTWIDTH] IN BLOOD BY AUTOMATED COUNT: 17.2 % (ref 11.9–14.6)
ERYTHROCYTE [DISTWIDTH] IN BLOOD BY AUTOMATED COUNT: 19.8 % (ref 11.9–14.6)
GLOBULIN SER CALC-MCNC: 3.5 G/DL (ref 2.3–3.5)
GLOBULIN SER CALC-MCNC: 4 G/DL (ref 2.3–3.5)
GLOBULIN SER CALC-MCNC: 4 G/DL (ref 2.3–3.5)
GLOBULIN SER CALC-MCNC: 4.1 G/DL (ref 2.3–3.5)
GLUCOSE SERPL-MCNC: 117 MG/DL (ref 65–100)
GLUCOSE SERPL-MCNC: 122 MG/DL (ref 65–100)
GLUCOSE SERPL-MCNC: 83 MG/DL (ref 65–100)
GLUCOSE SERPL-MCNC: 97 MG/DL (ref 65–100)
HCT VFR BLD AUTO: 28 % (ref 35.8–46.3)
HCT VFR BLD AUTO: 29.5 % (ref 35.8–46.3)
HCT VFR BLD AUTO: 30 % (ref 35.8–46.3)
HCT VFR BLD AUTO: 33.9 % (ref 35.8–46.3)
HGB BLD-MCNC: 10 G/DL (ref 11.7–15.4)
HGB BLD-MCNC: 11.3 G/DL (ref 11.7–15.4)
HGB BLD-MCNC: 9.3 G/DL (ref 11.7–15.4)
HGB BLD-MCNC: 9.8 G/DL (ref 11.7–15.4)
LYMPHOCYTES # BLD: 0.8 K/UL (ref 0.5–4.6)
LYMPHOCYTES # BLD: 0.9 K/UL (ref 0.5–4.6)
LYMPHOCYTES # BLD: 1 K/UL (ref 0.5–4.6)
LYMPHOCYTES # BLD: 1.1 K/UL (ref 0.5–4.6)
LYMPHOCYTES NFR BLD: 11 % (ref 13–44)
LYMPHOCYTES NFR BLD: 18 % (ref 13–44)
LYMPHOCYTES NFR BLD: 28 % (ref 13–44)
LYMPHOCYTES NFR BLD: 34 % (ref 13–44)
MAGNESIUM SERPL-MCNC: 1.7 MG/DL (ref 1.8–2.4)
MCH RBC QN AUTO: 30.2 PG (ref 26.1–32.9)
MCH RBC QN AUTO: 30.5 PG (ref 26.1–32.9)
MCH RBC QN AUTO: 31.3 PG (ref 26.1–32.9)
MCH RBC QN AUTO: 32 PG (ref 26.1–32.9)
MCHC RBC AUTO-ENTMCNC: 33.2 G/DL (ref 31.4–35)
MCHC RBC AUTO-ENTMCNC: 33.2 G/DL (ref 31.4–35)
MCHC RBC AUTO-ENTMCNC: 33.3 G/DL (ref 31.4–35)
MCHC RBC AUTO-ENTMCNC: 33.3 G/DL (ref 31.4–35)
MCV RBC AUTO: 91 FL (ref 79.6–97.8)
MCV RBC AUTO: 91.4 FL (ref 79.6–97.8)
MCV RBC AUTO: 93.8 FL (ref 79.6–97.8)
MCV RBC AUTO: 96.2 FL (ref 79.6–97.8)
MONOCYTES # BLD: 0.3 K/UL (ref 0.1–1.3)
MONOCYTES # BLD: 0.4 K/UL (ref 0.1–1.3)
MONOCYTES # BLD: 0.4 K/UL (ref 0.1–1.3)
MONOCYTES # BLD: 0.9 K/UL (ref 0.1–1.3)
MONOCYTES NFR BLD: 10 % (ref 4–12)
MONOCYTES NFR BLD: 12 % (ref 4–12)
MONOCYTES NFR BLD: 13 % (ref 4–12)
MONOCYTES NFR BLD: 6 % (ref 4–12)
NEUTS SEG # BLD: 1.7 K/UL (ref 1.7–8.2)
NEUTS SEG # BLD: 1.9 K/UL (ref 1.7–8.2)
NEUTS SEG # BLD: 3.4 K/UL (ref 1.7–8.2)
NEUTS SEG # BLD: 7.4 K/UL (ref 1.7–8.2)
NEUTS SEG NFR BLD: 52 % (ref 43–78)
NEUTS SEG NFR BLD: 60 % (ref 43–78)
NEUTS SEG NFR BLD: 75 % (ref 43–78)
NEUTS SEG NFR BLD: 80 % (ref 43–78)
NRBC # BLD: 0 K/UL (ref 0–0.2)
NRBC # BLD: 0.01 K/UL (ref 0–0.2)
PLATELET # BLD AUTO: 162 K/UL (ref 150–450)
PLATELET # BLD AUTO: 213 K/UL (ref 150–450)
PLATELET # BLD AUTO: 215 K/UL (ref 150–450)
PLATELET # BLD AUTO: 297 K/UL (ref 150–450)
PMV BLD AUTO: 10.1 FL (ref 10.8–14.1)
PMV BLD AUTO: 10.2 FL (ref 10.8–14.1)
PMV BLD AUTO: 10.6 FL (ref 10.8–14.1)
PMV BLD AUTO: 10.9 FL (ref 10.8–14.1)
POTASSIUM SERPL-SCNC: 2.9 MMOL/L (ref 3.5–5.1)
POTASSIUM SERPL-SCNC: 3.5 MMOL/L (ref 3.5–5.1)
POTASSIUM SERPL-SCNC: 3.6 MMOL/L (ref 3.5–5.1)
POTASSIUM SERPL-SCNC: 3.9 MMOL/L (ref 3.5–5.1)
PROT SERPL-MCNC: 6.7 G/DL (ref 6.3–8.2)
PROT SERPL-MCNC: 7.3 G/DL (ref 6.3–8.2)
PROT SERPL-MCNC: 7.6 G/DL (ref 6.3–8.2)
PROT SERPL-MCNC: 7.7 G/DL (ref 6.3–8.2)
RBC # BLD AUTO: 2.91 M/UL (ref 4.05–5.25)
RBC # BLD AUTO: 3.2 M/UL (ref 4.05–5.25)
RBC # BLD AUTO: 3.24 M/UL (ref 4.05–5.25)
RBC # BLD AUTO: 3.71 M/UL (ref 4.05–5.25)
SODIUM SERPL-SCNC: 139 MMOL/L (ref 136–145)
SODIUM SERPL-SCNC: 140 MMOL/L (ref 136–145)
SODIUM SERPL-SCNC: 142 MMOL/L (ref 136–145)
SODIUM SERPL-SCNC: 143 MMOL/L (ref 136–145)
WBC # BLD AUTO: 3.2 K/UL (ref 4.3–11.1)
WBC # BLD AUTO: 3.2 K/UL (ref 4.3–11.1)
WBC # BLD AUTO: 4.5 K/UL (ref 4.3–11.1)
WBC # BLD AUTO: 9.3 K/UL (ref 4.3–11.1)

## 2018-01-01 PROCEDURE — 70553 MRI BRAIN STEM W/O & W/DYE: CPT

## 2018-01-01 PROCEDURE — HOSPICE MEDICATION HC HH HOSPICE MEDICATION

## 2018-01-01 PROCEDURE — 74011636320 HC RX REV CODE- 636/320: Performed by: INTERNAL MEDICINE

## 2018-01-01 PROCEDURE — A9270 NON-COVERED ITEM OR SERVICE: HCPCS

## 2018-01-01 PROCEDURE — 85025 COMPLETE CBC W/AUTO DIFF WBC: CPT | Performed by: INTERNAL MEDICINE

## 2018-01-01 PROCEDURE — G0156 HHCP-SVS OF AIDE,EA 15 MIN: HCPCS

## 2018-01-01 PROCEDURE — 0651 HSPC ROUTINE HOME CARE

## 2018-01-01 PROCEDURE — T4541 LARGE DISPOSABLE UNDERPAD: HCPCS

## 2018-01-01 PROCEDURE — G0155 HHCP-SVS OF CSW,EA 15 MIN: HCPCS

## 2018-01-01 PROCEDURE — 83735 ASSAY OF MAGNESIUM: CPT | Performed by: INTERNAL MEDICINE

## 2018-01-01 PROCEDURE — A9577 INJ MULTIHANCE: HCPCS | Performed by: INTERNAL MEDICINE

## 2018-01-01 PROCEDURE — 80053 COMPREHEN METABOLIC PANEL: CPT | Performed by: INTERNAL MEDICINE

## 2018-01-01 PROCEDURE — G0299 HHS/HOSPICE OF RN EA 15 MIN: HCPCS

## 2018-01-01 PROCEDURE — T4526 ADULT SIZE PULL-ON MED: HCPCS

## 2018-01-01 PROCEDURE — 74011250636 HC RX REV CODE- 250/636: Performed by: INTERNAL MEDICINE

## 2018-01-01 PROCEDURE — A6250 SKIN SEAL PROTECT MOISTURIZR: HCPCS

## 2018-01-01 PROCEDURE — 3336500001 HSPC ELECTION

## 2018-01-01 PROCEDURE — G0300 HHS/HOSPICE OF LPN EA 15 MIN: HCPCS

## 2018-01-01 PROCEDURE — 74177 CT ABD & PELVIS W/CONTRAST: CPT

## 2018-01-01 PROCEDURE — 74011000258 HC RX REV CODE- 258: Performed by: INTERNAL MEDICINE

## 2018-01-01 RX ORDER — HEPARIN 100 UNIT/ML
500 SYRINGE INTRAVENOUS ONCE
Status: ACTIVE | OUTPATIENT
Start: 2018-01-01 | End: 2018-01-01

## 2018-01-01 RX ORDER — SODIUM CHLORIDE 0.9 % (FLUSH) 0.9 %
10 SYRINGE (ML) INJECTION
Status: COMPLETED | OUTPATIENT
Start: 2018-01-01 | End: 2018-01-01

## 2018-01-01 RX ORDER — HEPARIN 100 UNIT/ML
500 SYRINGE INTRAVENOUS AS NEEDED
Status: DISCONTINUED | OUTPATIENT
Start: 2018-01-01 | End: 2018-01-01 | Stop reason: HOSPADM

## 2018-01-01 RX ADMIN — Medication 10 ML: at 13:18

## 2018-01-01 RX ADMIN — SODIUM CHLORIDE, PRESERVATIVE FREE 500 UNITS: 5 INJECTION INTRAVENOUS at 14:13

## 2018-01-01 RX ADMIN — FENTANYL 1 PATCH: 12.5 PATCH TRANSDERMAL at 16:00

## 2018-01-01 RX ADMIN — Medication 10 ML: at 13:57

## 2018-01-01 RX ADMIN — FENTANYL 1 PATCH: 25 PATCH TRANSDERMAL at 15:30

## 2018-01-01 RX ADMIN — FENTANYL 1 PATCH: 25 PATCH TRANSDERMAL at 16:00

## 2018-01-01 RX ADMIN — Medication 10 MG: at 18:24

## 2018-01-01 RX ADMIN — IOPAMIDOL 100 ML: 755 INJECTION, SOLUTION INTRAVENOUS at 13:57

## 2018-01-01 RX ADMIN — SODIUM CHLORIDE 100 ML: 900 INJECTION, SOLUTION INTRAVENOUS at 13:57

## 2018-01-01 RX ADMIN — FENTANYL 1 PATCH: 12.5 PATCH TRANSDERMAL at 15:30

## 2018-01-01 RX ADMIN — DIATRIZOATE MEGLUMINE AND DIATRIZOATE SODIUM 15 ML: 660; 100 LIQUID ORAL; RECTAL at 12:55

## 2018-01-01 RX ADMIN — GADOBENATE DIMEGLUMINE 11 ML: 529 INJECTION, SOLUTION INTRAVENOUS at 13:18

## 2018-01-01 RX ADMIN — OXYCODONE HYDROCHLORIDE 5 MG: 5 TABLET ORAL at 15:50

## 2018-01-02 NOTE — PROGRESS NOTES
SW followed up with pt after MD appointment. RN [address] stated pt's chemo is on hold for this week. SW offered pt assistance in cancelling and rescheduling her [employer] [facility] and pt verbalized agreement. SW called [employer] and cancelled pt's transportation as pt's appointments have been cancelled. Pt's transportation for radiation oncology follow up scheduled for pt  at 10:16 am on 1/4/18. Pt made aware of remaining appointments and transportation. No other needs. SW provided SW contact information and encouraged pt to call should any other needs arise. Pt verbalized understanding. SW intends to follow up.

## 2018-01-08 NOTE — PROGRESS NOTES
LYNNETTE called Medicaid [place] [employer] transportation and scheduled pt's Vanessa Bales  for next appointment. See below. LYNNETTE mailed schedule to pt. No other needs.  LYNNETTE intends to follow up PRN.    2/6/18 Vanessa Bales  at 8:46 am for 9:30 am appointment  Confirmation number: [id number]

## 2018-01-31 NOTE — TELEPHONE ENCOUNTER
SW received voicemail from pt's daughter requesting clarification about pt's schedule. SW called pt's daughter back and provided pt's only upcoming appointment time as daughter listed as emergency contact. She verbalized appreciation and understanding. No other needs. SW intends to follow up PRN.

## 2018-02-12 NOTE — PROGRESS NOTES
SW received referral from [address] [place] [address] to schedule pt's Medicaid [employer] transportation for upcoming appointment. SW called and scheduled pt's transportation, see below. LYNNETTE then mailed pt a copy of her schedule. No other needs.  LYNNETTE intends to follow up PRN.    3/1/18   Tracy bonilla  at 11:58 am for 12:30 pm appointment  Confirmation number: [id number]    3/8/18   Tracy bonilla  at 12:01 pm for 12:45 pm appointment  Confirmation number: [id number]

## 2018-03-22 NOTE — TELEPHONE ENCOUNTER
LYNNETTE called Medicaid [employer] and scheduled pt transportation for her next appointment. SW mailed pt her schedule. No other needs. SW intends to follow up.

## 2023-02-08 NOTE — TELEPHONE ENCOUNTER
Pt is calling to cancel 3/21/2023 for procedure.  No rescheduling SW received call from pt's daughter stating pt has been staying at her house and missed Medicaid Trevor Rued  for appointment today. Daughter requested pt's appointment be rescheduled and daughter be notified of new date. SW relayed this to pt's RN Navigator Suzanne Albarran to facilitate coordination. SW intends to follow up and schedule pt's transport PRN.

## 2023-11-08 NOTE — PROGRESS NOTES
SW received referral from radiation RN for pt as she is unable to pay for medication. SW followed up with pt in person and provided information about [facility]. Pt stated she continues to be in pain. SW facilitated consultation to palliative care NP. Pt received new norco prescription. Pt's daughter stated she would be able to pay for this medication and would seek reimbursement. Pt is to have radiation appt on 8/7 with appt to be made and pt stated she didn't have transportation. SW offered to arrange transportation and pt verbalized agreement. SW intends to facilitate transportation when appt time is set. No other needs. SW provided SW contact information and encouraged pt and daughter to call should any needs arise. Pt verbalized understanding. SW intends to follow up.
08-Nov-2023 19:21:28

## (undated) DEVICE — BRONCHOSCOPY PACK: Brand: MEDLINE INDUSTRIES, INC.

## (undated) DEVICE — SINGLE USE ASPIRATION NEEDLE: Brand: SINGLE USE ASPIRATION NEEDLE

## (undated) DEVICE — SINGLE USE BIOPSY VALVE MAJ-210: Brand: SINGLE USE BIOPSY VALVE (STERILE)

## (undated) DEVICE — CANNULA NSL ORAL AD FOR CAPNOFLEX CO2 O2 AIRLFE

## (undated) DEVICE — SINGLE USE SUCTION VALVE MAJ-209: Brand: SINGLE USE SUCTION VALVE (STERILE)

## (undated) DEVICE — SET EXTN L6IN W/ S STL CLMP

## (undated) DEVICE — SOL INJ SOD CL0.9% 10ML PREFIL --

## (undated) DEVICE — (D)SYR 10ML 1/5ML GRAD NSAF -- PKGING CHANGE USE ITEM 338027

## (undated) DEVICE — Device: Brand: BALLOON

## (undated) DEVICE — KENDALL RADIOLUCENT FOAM MONITORING ELECTRODE RECTANGULAR SHAPE: Brand: KENDALL